# Patient Record
Sex: FEMALE | Race: BLACK OR AFRICAN AMERICAN | NOT HISPANIC OR LATINO | Employment: FULL TIME | URBAN - METROPOLITAN AREA
[De-identification: names, ages, dates, MRNs, and addresses within clinical notes are randomized per-mention and may not be internally consistent; named-entity substitution may affect disease eponyms.]

---

## 2023-09-13 ENCOUNTER — TELEPHONE (OUTPATIENT)
Dept: PRIMARY CARE | Facility: CLINIC | Age: 43
End: 2023-09-13
Payer: COMMERCIAL

## 2023-09-13 NOTE — TELEPHONE ENCOUNTER
Patient needs a letter to state that she is able to travel internationally with her medications for her upcoming trip. Patient states the HCTV and Amlodipine is what she would like addressed. Patient also would like to bring Zyrtec for allergies. Patient would like that included in letter and OK to take with her on trip. Patient is requesting that she can  the letter at the office. Please advise when letter is completed.

## 2023-09-21 LAB
ALANINE AMINOTRANSFERASE (SGPT) (U/L) IN SER/PLAS: 11 U/L (ref 7–45)
ALBUMIN (G/DL) IN SER/PLAS: 3.9 G/DL (ref 3.4–5)
ALKALINE PHOSPHATASE (U/L) IN SER/PLAS: 45 U/L (ref 33–110)
ASPARTATE AMINOTRANSFERASE (SGOT) (U/L) IN SER/PLAS: 18 U/L (ref 9–39)
BASOPHILS (10*3/UL) IN BLOOD BY AUTOMATED COUNT: 0.02 X10E9/L (ref 0–0.1)
BASOPHILS/100 LEUKOCYTES IN BLOOD BY AUTOMATED COUNT: 0.3 % (ref 0–2)
BILIRUBIN DIRECT (MG/DL) IN SER/PLAS: 0.1 MG/DL (ref 0–0.3)
BILIRUBIN TOTAL (MG/DL) IN SER/PLAS: 0.6 MG/DL (ref 0–1.2)
C REACTIVE PROTEIN (MG/L) IN SER/PLAS: 0.12 MG/DL
CALCIDIOL (25 OH VITAMIN D3) (NG/ML) IN SER/PLAS: 95 NG/ML
COBALAMIN (VITAMIN B12) (PG/ML) IN SER/PLAS: 757 PG/ML (ref 211–911)
EOSINOPHILS (10*3/UL) IN BLOOD BY AUTOMATED COUNT: 0.14 X10E9/L (ref 0–0.7)
EOSINOPHILS/100 LEUKOCYTES IN BLOOD BY AUTOMATED COUNT: 2.3 % (ref 0–6)
ERYTHROCYTE DISTRIBUTION WIDTH (RATIO) BY AUTOMATED COUNT: 14 % (ref 11.5–14.5)
ERYTHROCYTE MEAN CORPUSCULAR HEMOGLOBIN CONCENTRATION (G/DL) BY AUTOMATED: 31 G/DL (ref 32–36)
ERYTHROCYTE MEAN CORPUSCULAR VOLUME (FL) BY AUTOMATED COUNT: 86 FL (ref 80–100)
ERYTHROCYTES (10*6/UL) IN BLOOD BY AUTOMATED COUNT: 4.23 X10E12/L (ref 4–5.2)
HEMATOCRIT (%) IN BLOOD BY AUTOMATED COUNT: 36.4 % (ref 36–46)
HEMOGLOBIN (G/DL) IN BLOOD: 11.3 G/DL (ref 12–16)
IMMATURE GRANULOCYTES/100 LEUKOCYTES IN BLOOD BY AUTOMATED COUNT: 0.2 % (ref 0–0.9)
LEUKOCYTES (10*3/UL) IN BLOOD BY AUTOMATED COUNT: 6.1 X10E9/L (ref 4.4–11.3)
LYMPHOCYTES (10*3/UL) IN BLOOD BY AUTOMATED COUNT: 1.84 X10E9/L (ref 1.2–4.8)
LYMPHOCYTES/100 LEUKOCYTES IN BLOOD BY AUTOMATED COUNT: 30.1 % (ref 13–44)
MONOCYTES (10*3/UL) IN BLOOD BY AUTOMATED COUNT: 0.44 X10E9/L (ref 0.1–1)
MONOCYTES/100 LEUKOCYTES IN BLOOD BY AUTOMATED COUNT: 7.2 % (ref 2–10)
NEUTROPHILS (10*3/UL) IN BLOOD BY AUTOMATED COUNT: 3.67 X10E9/L (ref 1.2–7.7)
NEUTROPHILS/100 LEUKOCYTES IN BLOOD BY AUTOMATED COUNT: 59.9 % (ref 40–80)
NRBC (PER 100 WBCS) BY AUTOMATED COUNT: 0 /100 WBC (ref 0–0)
PLATELETS (10*3/UL) IN BLOOD AUTOMATED COUNT: 250 X10E9/L (ref 150–450)
PROTEIN TOTAL: 7.4 G/DL (ref 6.4–8.2)

## 2023-10-02 ENCOUNTER — PHARMACY VISIT (OUTPATIENT)
Dept: PHARMACY | Facility: CLINIC | Age: 43
End: 2023-10-02
Payer: COMMERCIAL

## 2023-10-02 PROCEDURE — RXMED WILLOW AMBULATORY MEDICATION CHARGE

## 2023-10-05 ENCOUNTER — HOSPITAL ENCOUNTER (EMERGENCY)
Facility: HOSPITAL | Age: 43
Discharge: HOME | End: 2023-10-05
Attending: EMERGENCY MEDICINE
Payer: COMMERCIAL

## 2023-10-05 DIAGNOSIS — U07.1 COVID-19: Primary | ICD-10-CM

## 2023-10-05 LAB
ALBUMIN SERPL BCP-MCNC: 3.8 G/DL (ref 3.4–5)
ALP SERPL-CCNC: 40 U/L (ref 33–110)
ALT SERPL W P-5'-P-CCNC: 12 U/L (ref 7–45)
ANION GAP SERPL CALC-SCNC: 11 MMOL/L (ref 10–20)
AST SERPL W P-5'-P-CCNC: 23 U/L (ref 9–39)
BASOPHILS # BLD AUTO: 0.02 X10*3/UL (ref 0–0.1)
BASOPHILS NFR BLD AUTO: 0.5 %
BILIRUB SERPL-MCNC: 0.4 MG/DL (ref 0–1.2)
BUN SERPL-MCNC: 14 MG/DL (ref 6–23)
CALCIUM SERPL-MCNC: 8.9 MG/DL (ref 8.6–10.3)
CHLORIDE SERPL-SCNC: 100 MMOL/L (ref 98–107)
CO2 SERPL-SCNC: 29 MMOL/L (ref 21–32)
CREAT SERPL-MCNC: 0.83 MG/DL (ref 0.5–1.05)
EOSINOPHIL # BLD AUTO: 0.03 X10*3/UL (ref 0–0.7)
EOSINOPHIL NFR BLD AUTO: 0.8 %
ERYTHROCYTE [DISTWIDTH] IN BLOOD BY AUTOMATED COUNT: 13.8 % (ref 11.5–14.5)
GFR SERPL CREATININE-BSD FRML MDRD: 90 ML/MIN/1.73M*2
GLUCOSE SERPL-MCNC: 102 MG/DL (ref 74–99)
HCT VFR BLD AUTO: 36.3 % (ref 36–46)
HGB BLD-MCNC: 11.6 G/DL (ref 12–16)
IMM GRANULOCYTES # BLD AUTO: 0.01 X10*3/UL (ref 0–0.7)
IMM GRANULOCYTES NFR BLD AUTO: 0.3 % (ref 0–0.9)
LYMPHOCYTES # BLD AUTO: 0.99 X10*3/UL (ref 1.2–4.8)
LYMPHOCYTES NFR BLD AUTO: 26.2 %
MCH RBC QN AUTO: 26.9 PG (ref 26–34)
MCHC RBC AUTO-ENTMCNC: 32 G/DL (ref 32–36)
MCV RBC AUTO: 84 FL (ref 80–100)
MONOCYTES # BLD AUTO: 0.61 X10*3/UL (ref 0.1–1)
MONOCYTES NFR BLD AUTO: 16.1 %
NEUTROPHILS # BLD AUTO: 2.12 X10*3/UL (ref 1.2–7.7)
NEUTROPHILS NFR BLD AUTO: 56.1 %
NRBC BLD-RTO: 0 /100 WBCS (ref 0–0)
PLATELET # BLD AUTO: 192 X10*3/UL (ref 150–450)
PMV BLD AUTO: 10.1 FL (ref 7.5–11.5)
POTASSIUM SERPL-SCNC: 3.6 MMOL/L (ref 3.5–5.3)
PROT SERPL-MCNC: 7.3 G/DL (ref 6.4–8.2)
RBC # BLD AUTO: 4.32 X10*6/UL (ref 4–5.2)
SARS-COV-2 RNA RESP QL NAA+PROBE: DETECTED
SODIUM SERPL-SCNC: 136 MMOL/L (ref 136–145)
WBC # BLD AUTO: 3.8 X10*3/UL (ref 4.4–11.3)

## 2023-10-05 PROCEDURE — 99284 EMERGENCY DEPT VISIT MOD MDM: CPT | Performed by: EMERGENCY MEDICINE

## 2023-10-05 PROCEDURE — 80053 COMPREHEN METABOLIC PANEL: CPT | Performed by: EMERGENCY MEDICINE

## 2023-10-05 PROCEDURE — 2500000004 HC RX 250 GENERAL PHARMACY W/ HCPCS (ALT 636 FOR OP/ED): Performed by: EMERGENCY MEDICINE

## 2023-10-05 PROCEDURE — 85025 COMPLETE CBC W/AUTO DIFF WBC: CPT | Performed by: EMERGENCY MEDICINE

## 2023-10-05 PROCEDURE — 87635 SARS-COV-2 COVID-19 AMP PRB: CPT | Performed by: EMERGENCY MEDICINE

## 2023-10-05 PROCEDURE — 36415 COLL VENOUS BLD VENIPUNCTURE: CPT | Performed by: EMERGENCY MEDICINE

## 2023-10-05 PROCEDURE — 96360 HYDRATION IV INFUSION INIT: CPT

## 2023-10-05 PROCEDURE — 99283 EMERGENCY DEPT VISIT LOW MDM: CPT | Mod: 25

## 2023-10-05 RX ORDER — ONDANSETRON HYDROCHLORIDE 2 MG/ML
4 INJECTION, SOLUTION INTRAVENOUS ONCE
Status: DISCONTINUED | OUTPATIENT
Start: 2023-10-05 | End: 2023-10-05 | Stop reason: HOSPADM

## 2023-10-05 RX ADMIN — SODIUM CHLORIDE 1000 ML: 9 INJECTION, SOLUTION INTRAVENOUS at 11:00

## 2023-10-05 NOTE — ED PROVIDER NOTES
HPI   No chief complaint on file.      42-year-old female presents with lightheadedness and generalized weakness.  She has had symptoms for several days but was much worse today while at work.  She did have the COVID booster this past Monday and her symptoms started that day.  She has fatigue and body aches, no significant cough or chest pain.  She reports fever and chills.                          No data recorded                Patient History   Past Medical History:   Diagnosis Date    Crohn's disease of small intestine without complications (CMS/[facility]) 03/29/2019    Crohn's disease of ileum    Lower abdominal pain, unspecified 03/24/2022    Abdominal pain, lower     Past Surgical History:   Procedure Laterality Date    OTHER SURGICAL HISTORY  01/24/2019    Ileocecal resection    OTHER SURGICAL HISTORY  01/08/2020    Small bowel resection    OTHER SURGICAL HISTORY  12/27/2019    Colectomy    OTHER SURGICAL HISTORY  11/04/2019    [place] procedure    OTHER SURGICAL HISTORY  11/04/2019    Right hemicolectomy    OTHER SURGICAL HISTORY  11/04/2019    Ileostomy closure     No family history on file.  Social History     Tobacco Use    Smoking status: Not on file    Smokeless tobacco: Not on file   Substance Use Topics    Alcohol use: Not on file    Drug use: Not on file       Physical Exam   ED Triage Vitals   Temp Pulse Resp BP   -- -- -- --      SpO2 Temp src Heart Rate Source Patient Position   -- -- -- --      BP Location FiO2 (%)     -- --       Physical Exam  Vitals and nursing note reviewed.   Constitutional:       General: She is not in acute distress.     Appearance: She is well-developed. She is not ill-appearing.   HENT:      Head: Normocephalic and atraumatic.   Eyes:      Conjunctiva/sclera: Conjunctivae normal.   Cardiovascular:      Heart sounds: No murmur heard.  Abdominal:      Palpations: Abdomen is soft.   Musculoskeletal:         General: Swelling present.   Skin:     General: Skin is dry.    Neurological:      Mental Status: She is alert.   Psychiatric:         Mood and Affect: Mood normal.         ED Course & MDM   Diagnoses as of 10/05/23 1222   COVID-19       Medical Decision Making  She is slightly leukopenic with slightly low lymphocytes.  CMP unremarkable.  She is COVID-positive.  She was given IV fluids and is feeling better.  1 L of normal saline and 4 mg of IV Zofran.  She is able to ambulate.  She is comfortable going home.  Return precautions explained at length.        Procedure  Procedures     Taj Jones MD  10/05/23 1228

## 2023-10-05 NOTE — Clinical Note
Samina Javier was seen and treated in our emergency department on 10/5/2023.  She may return to work on 10/07/2023.       If you have any questions or concerns, please don't hesitate to call.      Taj Jones MD

## 2023-10-11 ENCOUNTER — PHARMACY VISIT (OUTPATIENT)
Dept: PHARMACY | Facility: CLINIC | Age: 43
End: 2023-10-11
Payer: COMMERCIAL

## 2023-10-11 DIAGNOSIS — I10 UNCONTROLLED HYPERTENSION: Primary | ICD-10-CM

## 2023-10-11 DIAGNOSIS — I10 HYPERTENSION, ESSENTIAL: ICD-10-CM

## 2023-10-11 PROBLEM — K50.90 CROHN DISEASE (MULTI): Status: ACTIVE | Noted: 2023-10-11

## 2023-10-11 PROBLEM — L91.0 HYPERTROPHIC SCAR: Status: ACTIVE | Noted: 2023-10-11

## 2023-10-11 PROBLEM — H61.22 IMPACTED CERUMEN OF LEFT EAR: Status: ACTIVE | Noted: 2023-10-11

## 2023-10-11 PROBLEM — R92.8 ABNORMAL MAMMOGRAM: Status: ACTIVE | Noted: 2023-10-11

## 2023-10-11 PROBLEM — R10.30 ABDOMINAL PAIN, LOWER: Status: ACTIVE | Noted: 2023-10-11

## 2023-10-11 PROBLEM — D50.9 IRON DEFICIENCY ANEMIA: Status: ACTIVE | Noted: 2023-10-11

## 2023-10-11 PROBLEM — H93.8X2 SENSATION OF FULLNESS IN LEFT EAR: Status: ACTIVE | Noted: 2023-10-11

## 2023-10-11 PROBLEM — Q43.3 INTESTINAL MALROTATION (MULTI): Status: ACTIVE | Noted: 2023-10-11

## 2023-10-11 PROBLEM — E55.9 VITAMIN D DEFICIENCY: Status: ACTIVE | Noted: 2023-10-11

## 2023-10-11 PROCEDURE — RXMED WILLOW AMBULATORY MEDICATION CHARGE

## 2023-10-11 RX ORDER — ATOVAQUONE AND PROGUANIL HYDROCHLORIDE 250; 100 MG/1; MG/1
1 TABLET, FILM COATED ORAL
COMMUNITY
Start: 2023-07-20 | End: 2023-11-16 | Stop reason: ALTCHOICE

## 2023-10-11 RX ORDER — HYOSCYAMINE SULFATE 0.12 MG/1
0.12 TABLET, ORALLY DISINTEGRATING ORAL 4 TIMES DAILY
COMMUNITY
Start: 2021-04-15 | End: 2024-01-26 | Stop reason: ALTCHOICE

## 2023-10-11 RX ORDER — AMLODIPINE BESYLATE 10 MG/1
10 TABLET ORAL DAILY
Qty: 90 TABLET | Refills: 3 | Status: SHIPPED | OUTPATIENT
Start: 2023-10-11 | End: 2024-04-24 | Stop reason: ALTCHOICE

## 2023-10-11 RX ORDER — CIPROFLOXACIN 500 MG/1
500 TABLET ORAL 2 TIMES DAILY
COMMUNITY
Start: 2023-07-11 | End: 2023-11-16 | Stop reason: ALTCHOICE

## 2023-10-11 RX ORDER — HYDROCHLOROTHIAZIDE 12.5 MG/1
1 TABLET ORAL DAILY
COMMUNITY
Start: 2022-11-09 | End: 2023-11-13 | Stop reason: SDUPTHER

## 2023-10-11 RX ORDER — UBIDECARENONE 75 MG
1 CAPSULE ORAL DAILY
COMMUNITY

## 2023-10-11 RX ORDER — LOPERAMIDE HYDROCHLORIDE 2 MG/1
2 CAPSULE ORAL 3 TIMES DAILY PRN
COMMUNITY
Start: 2020-04-09

## 2023-10-24 DIAGNOSIS — K50.812 CROHN'S DISEASE OF BOTH SMALL AND LARGE INTESTINE WITH INTESTINAL OBSTRUCTION (MULTI): Primary | ICD-10-CM

## 2023-10-27 ENCOUNTER — HOSPITAL ENCOUNTER (OUTPATIENT)
Dept: CARDIOLOGY | Facility: HOSPITAL | Age: 43
Discharge: HOME | End: 2023-10-27
Payer: COMMERCIAL

## 2023-10-27 LAB
ATRIAL RATE: 79 BPM
P AXIS: -10 DEGREES
PR INTERVAL: 138 MS
Q ONSET: 253 MS
QRS COUNT: 13 BEATS
QRS DURATION: 105 MS
QT INTERVAL: 422 MS
QTC CALCULATION(BAZETT): 481 MS
QTC FREDERICIA: 460 MS
R AXIS: 76 DEGREES
T AXIS: 43 DEGREES
T OFFSET: 464 MS
VENTRICULAR RATE: 78 BPM

## 2023-10-27 PROCEDURE — 93005 ELECTROCARDIOGRAM TRACING: CPT

## 2023-10-27 RX ORDER — ADALIMUMAB 40MG/0.4ML
KIT SUBCUTANEOUS
Qty: 12 EACH | Refills: 3 | Status: SHIPPED | OUTPATIENT
Start: 2023-10-27 | End: 2024-10-25

## 2023-11-08 ENCOUNTER — PHARMACY VISIT (OUTPATIENT)
Dept: PHARMACY | Facility: CLINIC | Age: 43
End: 2023-11-08
Payer: COMMERCIAL

## 2023-11-08 PROCEDURE — RXMED WILLOW AMBULATORY MEDICATION CHARGE

## 2023-11-13 ENCOUNTER — PHARMACY VISIT (OUTPATIENT)
Dept: PHARMACY | Facility: CLINIC | Age: 43
End: 2023-11-13
Payer: COMMERCIAL

## 2023-11-13 DIAGNOSIS — I10 UNCONTROLLED HYPERTENSION: Primary | ICD-10-CM

## 2023-11-13 DIAGNOSIS — I10 PRIMARY HYPERTENSION: ICD-10-CM

## 2023-11-13 PROCEDURE — RXMED WILLOW AMBULATORY MEDICATION CHARGE

## 2023-11-13 RX ORDER — HYDROCHLOROTHIAZIDE 12.5 MG/1
12.5 TABLET ORAL DAILY
Qty: 90 TABLET | Refills: 3 | Status: SHIPPED | OUTPATIENT
Start: 2023-11-13 | End: 2024-11-12

## 2023-11-15 NOTE — PROGRESS NOTES
Subjective   Patient ID: Samina Javier is a 43 y.o. female who presents for her annual physical       She enjoyed her time in [place] in 9/2023  She caught COVID from one of the people that went with her     She was supposed to have the hysteroscopy and D&C in 5/2023 but there was issues with the schedules.     Her BP readings at home have been systolic 110- 115  The hydrochlorothiazide causes her to have urinary frequency    She is walking the dog for exercise 3- 4 times a week   She has trouble making an exercise regimen   She is eating well.     She complains of an area on her ear that has been present for a few years  It scabs over and keeps coming back. She is unsure why it will not heal completely     She complains of intermittent right shoulder pain  Sometimes the pain is really bad. Getting a massage will help the shoulder  She started going to the stretch lab and that has helped     She has intermittent paresthesia and burning pain in the left knee  She is unable to associate her Sx with any particular movement.  The pain happens sitting, standing and supine. It has become more bothersome to her   She denies injury to the left knee       HEALTH:  Pap more than 5 years ago, attempted 11/2022 but failed, She saw Dr Xie in 2/2023  Mammo 2016, 12/2021, 12/2022, ordered 11/2023  BD never   Colon 10/2020 Rutgeerts i1 recurrence with a single 2 mm erosion in izabella-TI. 1/2023 normal and Q 3- 5  EKG 2019, 11/2021, 10/2023  Urine  Hep A 7/11/2023   Hep B vaccine completed x 3   Flu 9/2021, 10/2022, 11/2023  TDAP she will check for date   Prevnar never  Pneumo never  Shingrix 3/2020 and 8/2020   Moderna CVD 12/28/2021 and 1/25/2021 booster 11/5/2021, 5/2022, 11/2/2022, 10/2/2023  Ophth Last seen summer 2023. No history of glaucoma or MD. She wears glasses        Review of Systems  All systems negative except those listed in the HPI      Past Medical, Surgical, and Family History reviewed and updated in  chart.  Reviewed all medications by prescribing practitioner or clinical pharmacist   (such as prescriptions, OTCs, herbal therapies and supplements) and documented in the medical record      Objective   Visit Vitals  /74 (BP Location: Left arm, Patient Position: Sitting, BP Cuff Size: Adult)   Pulse 55   Temp 36.5 °C (97.7 °F)   Resp 16    Body mass index is 22.11 kg/m².      Physical Exam  Vitals reviewed.   Constitutional:       Appearance: Normal appearance. She is normal weight.   HENT:      Head: Normocephalic.      Right Ear: Tympanic membrane, ear canal and external ear normal.      Left Ear: Tympanic membrane, ear canal and external ear normal.      Nose: Nose normal.      Mouth/Throat:      Pharynx: Oropharynx is clear.   Eyes:      Conjunctiva/sclera: Conjunctivae normal.   Cardiovascular:      Rate and Rhythm: Normal rate and regular rhythm.      Pulses: Normal pulses.      Heart sounds: Normal heart sounds.   Pulmonary:      Effort: Pulmonary effort is normal.      Breath sounds: Normal breath sounds.   Abdominal:      General: Bowel sounds are normal.      Palpations: Abdomen is soft.   Musculoskeletal:         General: Normal range of motion.      Cervical back: Normal range of motion and neck supple.      Comments: full ROM but she is really tight, right shoulder raised, nothing noted in the scapular area or rotator cuff    Skin:     General: Skin is warm.      Comments: Eczema auricle right ear and scabbed lesion on the neck   Neurological:      General: No focal deficit present.      Mental Status: She is alert and oriented to person, place, and time.   Psychiatric:         Mood and Affect: Mood normal.         Behavior: Behavior normal.         Thought Content: Thought content normal.         Judgment: Judgment normal.       Assessment/Plan        Annual physical completed  Reviewed her labs from 9/2023 and 10/2023     [facility]  -  Discussed healthy diet and regular  exercise.    -  Physical exam overall unremarkable. Immunizations reviewed and updated accordingly. Healthy lifestyle choices discussed (tobacco avoidance, appropriate alcohol use, avoidance of illicit substances).   -  Patient is wearing seatbelt.   -  Screening lab work ordered as indicated.    -  Age appropriate screening tests reviewed with patient.       She is single with no children. 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  CMP unremarkable.    She is COVID-positive.  She was given IV fluids and is feeling better.    1 L of normal saline and 4 mg of IV Zofran.    She is able to ambulate.  She is comfortable going home.    Return precautions explained at length.      She saw Dr Connors in ENT and had cerumen cleaned bilaterally 6/28/2023  She has no issues with her hearing to report      Her weight/ BMI is in normal range in office, recommend she maintain   Her weight is 117 pounds with BMI at 22.11 IO 11/2023  She walks her dog 3- 4 times a week      HTN, onset 11/2021: Her BP readings at home have been systolic 110- 115.   Her mother started BP medications in her 70's   Her dad has CAD w/ stent.   Her sister is on amlodipine as well and had pericarditis last year 2021.   She feels stress is exacerbating her BP   Continue Norvasc 10 mg daily and hydrochlorothiazide 12.5 mg daily   EKG 10/2023 was sinus rhythm   Renal artery US 11/2021 normal   ECHO 11/2021 was normal   Recommend she monitor her BP at home and call with elevated readings      I have spent 15 min face to face with this patient discussing their cardiac risk and behavioral therapies of nutrition choices and exercise. We are trying to eliminate habits that are contributing to their  cardiac risk.  We agreed on a plan of how they can reduce their current CV risk   The patient's  10 yr CV risk was estimated at   0.7 % 11/2023     Crohn disease: Stable   She has struggled with this since age 14.    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 Extensive lysis of adhesions interloop, abdominal wall and retroperitoneal, hepatic, drainage of intra- abdominal abscess, small bowel resection and stricturoplasty with repair of enterotomies on 12/9/2019 with Dr Reeves   MRI 2017 showed 7 cm of disease   Stopped Remicade in 10/2018  MRI Enterography 6/2022 showed post surgical changes c/w Juanpablo's procedure and ileocolonic resection with primary anastomosis. No evidence of thickening or enhancement to suggest an acute Crohn's flare. Mild central intrahepatic biliary dilation with slight irregularity of the dilated duct predominantly in the left lobe without definite stricture. Cholelithiasis with gallstone in the neck of the gallbladder with abutting the common hepatic duct. Subseptate uterus with a fibroid. Hemorrhagic right ovarian cyst   Colonoscopy 1/2023 normal and Q 3- 5    She has hyoscyamine to use prn abdominal pain and cramping   Continue Humira 40 mg SQ Q 14 days and loperamide 1- 2 tablets preprandial QID  She saw Dr Pierce in 9/2023 and follow in a year     Intermittent right shoulder pain: On exam: full ROM but she is really tight, right shoulder raised, nothing noted in the scapular area or rotator cuff 11/2023  Sometimes the pain is really bad. Getting a massage will help the shoulder  She started going to the stretch lab and that has helped   Recommend and orders placed for PT and discuss dry needling 11/2023    Intermittent paresthesia and burning pain in the lateral left knee: Explained I think  this is a nerve being impinged. Recommend using a light sleeve over the knee when she is going to stand for long periods   She is unable to associate her Sx with any particular movement.  The pain happens sitting, standing and supine. It has become more bothersome to her   She denies injury to the left knee.  Recommend and orders placed for Xray left knee for further evaluation 11/2023  Recommend and orders placed for PT 11/2023    Eczema auricle right ear and scabbed lesion on the neck: On exam: scabbing over eczema patch right ear 11/2023  She complains of an area on her ear that has been present for a few years  It scabs over and keeps coming back. She is unsure why it will not heal completely   Recommend and orders placed for dermatology for further evaluation 11/2023     Vitamin D def:  Continue scripted Vitamin D 50 K UT twice weekly   Continue Vitamin B 12 daily      Pap more than 5 years ago. She saw Dr Xie in 2/2023.  Pap attempted but failed due to tight pelvic structures > right 11/2022.    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 There is extensive abdominal scar tissue that limits exam. The endometrium is suboptimally visualized and appears to be prominent. The right ovary is subvisualized.  The left ovary is not visualized. The bladder is not well distended. Transvaginal could not be done due to patient's inability to tolerate.   Recommend hysteroscopy and D&C 2/2023. She was supposed to have the hysteroscopy and D&C in 5/2023 but there was issues with the schedules.    She will see Dr Xie again in 2/2024 and they will discuss hysteroscopy and D&C then     Mammo normal in 12/2022 and ordered 11/2023..   Breast exam normal except right breast more nodular than left 11/2022   Her sister sent her information from her genetic testing, she will check for BRACA   Sister, maternal grandmother and maternal aunt with breast cancer      BD never   Colonoscopy 1/2023  normal and Q 3- 5      Ophth:  Last seen summer 2023. No history of glaucoma or MD. She wears glasses      Hep A 7/11/2023   Hep B vaccine completed x 3   Flu 9/2021, 10/2022, 11/2023  TDAP she will check for date   Prevnar never  Pneumo never  Shingrix 3/2020 and 8/2020   Moderna CVD 12/28/2021 and 1/25/2021 booster 11/5/2021, 5/2022, 11/2/2022, 10/2/2023     Some elements in the chart were copied from Dr. Palumbo's last office visit with patient.   Notes have been updated where appropriate, and reflect my current medical decision making from today.      Return in 1 year for CPE or sooner if needed     Scribe Attestation  By signing my name below, I, Kathy Quispe , Scribe   attest that this documentation has been prepared under the direction and in the presence of Dorothea Palumbo MD.

## 2023-11-16 ENCOUNTER — OFFICE VISIT (OUTPATIENT)
Dept: PRIMARY CARE | Facility: CLINIC | Age: 43
End: 2023-11-16
Payer: COMMERCIAL

## 2023-11-16 VITALS
TEMPERATURE: 97.7 F | WEIGHT: 117 LBS | HEIGHT: 61 IN | RESPIRATION RATE: 16 BRPM | OXYGEN SATURATION: 100 % | DIASTOLIC BLOOD PRESSURE: 74 MMHG | HEART RATE: 55 BPM | BODY MASS INDEX: 22.09 KG/M2 | SYSTOLIC BLOOD PRESSURE: 128 MMHG

## 2023-11-16 DIAGNOSIS — K50.10 CROHN'S DISEASE OF LARGE INTESTINE WITHOUT COMPLICATION (MULTI): ICD-10-CM

## 2023-11-16 DIAGNOSIS — D50.9 IRON DEFICIENCY ANEMIA, UNSPECIFIED IRON DEFICIENCY ANEMIA TYPE: ICD-10-CM

## 2023-11-16 DIAGNOSIS — M25.562 CHRONIC PAIN OF LEFT KNEE: ICD-10-CM

## 2023-11-16 DIAGNOSIS — G89.29 CHRONIC PAIN OF LEFT KNEE: ICD-10-CM

## 2023-11-16 DIAGNOSIS — E55.9 VITAMIN D DEFICIENCY: ICD-10-CM

## 2023-11-16 DIAGNOSIS — Z12.31 VISIT FOR SCREENING MAMMOGRAM: ICD-10-CM

## 2023-11-16 DIAGNOSIS — Z23 NEED FOR INFLUENZA VACCINATION: ICD-10-CM

## 2023-11-16 DIAGNOSIS — H93.91 LESION OF RIGHT EAR: ICD-10-CM

## 2023-11-16 DIAGNOSIS — L98.9 SKIN LESION OF NECK: ICD-10-CM

## 2023-11-16 DIAGNOSIS — M54.2 NECK PAIN ON RIGHT SIDE: ICD-10-CM

## 2023-11-16 DIAGNOSIS — I10 ESSENTIAL HYPERTENSION: ICD-10-CM

## 2023-11-16 DIAGNOSIS — Z00.00 HEALTHCARE MAINTENANCE: Primary | ICD-10-CM

## 2023-11-16 PROBLEM — H93.8X2 SENSATION OF FULLNESS IN LEFT EAR: Status: RESOLVED | Noted: 2023-10-11 | Resolved: 2023-11-16

## 2023-11-16 PROCEDURE — 90686 IIV4 VACC NO PRSV 0.5 ML IM: CPT | Performed by: INTERNAL MEDICINE

## 2023-11-16 PROCEDURE — 3074F SYST BP LT 130 MM HG: CPT | Performed by: INTERNAL MEDICINE

## 2023-11-16 PROCEDURE — 99396 PREV VISIT EST AGE 40-64: CPT | Performed by: INTERNAL MEDICINE

## 2023-11-16 PROCEDURE — 1036F TOBACCO NON-USER: CPT | Performed by: INTERNAL MEDICINE

## 2023-11-16 PROCEDURE — 3078F DIAST BP <80 MM HG: CPT | Performed by: INTERNAL MEDICINE

## 2023-11-16 PROCEDURE — 3008F BODY MASS INDEX DOCD: CPT | Performed by: INTERNAL MEDICINE

## 2023-11-16 PROCEDURE — 90471 IMMUNIZATION ADMIN: CPT | Performed by: INTERNAL MEDICINE

## 2023-11-16 ASSESSMENT — PATIENT HEALTH QUESTIONNAIRE - PHQ9
2. FEELING DOWN, DEPRESSED OR HOPELESS: NOT AT ALL
1. LITTLE INTEREST OR PLEASURE IN DOING THINGS: NOT AT ALL
SUM OF ALL RESPONSES TO PHQ9 QUESTIONS 1 AND 2: 0

## 2023-11-16 ASSESSMENT — ENCOUNTER SYMPTOMS
LOSS OF SENSATION IN FEET: 0
OCCASIONAL FEELINGS OF UNSTEADINESS: 0
DEPRESSION: 0

## 2023-11-29 ENCOUNTER — PHARMACY VISIT (OUTPATIENT)
Dept: PHARMACY | Facility: CLINIC | Age: 43
End: 2023-11-29
Payer: COMMERCIAL

## 2023-11-29 PROCEDURE — RXMED WILLOW AMBULATORY MEDICATION CHARGE

## 2023-12-05 ENCOUNTER — HOSPITAL ENCOUNTER (OUTPATIENT)
Dept: RADIOLOGY | Facility: HOSPITAL | Age: 43
Discharge: HOME | End: 2023-12-05
Payer: COMMERCIAL

## 2023-12-05 DIAGNOSIS — G89.29 CHRONIC PAIN OF LEFT KNEE: ICD-10-CM

## 2023-12-05 DIAGNOSIS — M25.562 CHRONIC PAIN OF LEFT KNEE: ICD-10-CM

## 2023-12-05 PROCEDURE — 73562 X-RAY EXAM OF KNEE 3: CPT | Mod: LEFT SIDE | Performed by: RADIOLOGY

## 2023-12-05 PROCEDURE — 73562 X-RAY EXAM OF KNEE 3: CPT | Mod: LT

## 2023-12-13 ENCOUNTER — ANCILLARY PROCEDURE (OUTPATIENT)
Dept: RADIOLOGY | Facility: CLINIC | Age: 43
End: 2023-12-13
Payer: COMMERCIAL

## 2023-12-13 DIAGNOSIS — Z12.31 VISIT FOR SCREENING MAMMOGRAM: ICD-10-CM

## 2023-12-13 PROCEDURE — 77067 SCR MAMMO BI INCL CAD: CPT | Performed by: RADIOLOGY

## 2023-12-13 PROCEDURE — 77067 SCR MAMMO BI INCL CAD: CPT

## 2023-12-13 PROCEDURE — 77063 BREAST TOMOSYNTHESIS BI: CPT | Performed by: RADIOLOGY

## 2023-12-14 DIAGNOSIS — N64.89 BREAST ASYMMETRY: Primary | ICD-10-CM

## 2023-12-20 ENCOUNTER — TELEPHONE (OUTPATIENT)
Dept: INFECTIOUS DISEASES | Facility: HOSPITAL | Age: 43
End: 2023-12-20
Payer: COMMERCIAL

## 2023-12-20 NOTE — TELEPHONE ENCOUNTER
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   She need a vaccine for YF and any other medications needed for [place] traveling 3/5-3/17 2024.     [facility]. Please also advise she needs her 2nd hep A after 1/11/2024.

## 2023-12-21 DIAGNOSIS — Z23 NEED FOR PROPHYLACTIC VACCINATION AND INOCULATION AGAINST INFLUENZA: ICD-10-CM

## 2023-12-21 DIAGNOSIS — Z71.84 COUNSELING FOR TRAVEL: Primary | ICD-10-CM

## 2023-12-21 DIAGNOSIS — Z71.84 COUNSELING ABOUT TRAVEL: Primary | ICD-10-CM

## 2023-12-21 RX ORDER — CIPROFLOXACIN 500 MG/1
500 TABLET ORAL 2 TIMES DAILY
Qty: 6 TABLET | Refills: 0 | Status: SHIPPED | OUTPATIENT
Start: 2023-12-21 | End: 2024-01-12

## 2023-12-27 PROCEDURE — RXMED WILLOW AMBULATORY MEDICATION CHARGE

## 2023-12-28 ENCOUNTER — PHARMACY VISIT (OUTPATIENT)
Dept: PHARMACY | Facility: CLINIC | Age: 43
End: 2023-12-28
Payer: COMMERCIAL

## 2023-12-29 ENCOUNTER — ANCILLARY PROCEDURE (OUTPATIENT)
Dept: RADIOLOGY | Facility: CLINIC | Age: 43
End: 2023-12-29
Payer: COMMERCIAL

## 2023-12-29 DIAGNOSIS — N64.89 BREAST ASYMMETRY: ICD-10-CM

## 2023-12-29 PROCEDURE — 77061 BREAST TOMOSYNTHESIS UNI: CPT | Mod: LT

## 2023-12-29 PROCEDURE — 77065 DX MAMMO INCL CAD UNI: CPT | Mod: LEFT SIDE | Performed by: RADIOLOGY

## 2023-12-29 PROCEDURE — 76642 ULTRASOUND BREAST LIMITED: CPT | Mod: LEFT SIDE | Performed by: RADIOLOGY

## 2023-12-29 PROCEDURE — 77061 BREAST TOMOSYNTHESIS UNI: CPT | Mod: LEFT SIDE | Performed by: RADIOLOGY

## 2023-12-29 PROCEDURE — 76642 ULTRASOUND BREAST LIMITED: CPT | Mod: LT

## 2024-01-02 DIAGNOSIS — R92.8 ABNORMAL FINDING ON BREAST IMAGING: ICD-10-CM

## 2024-01-04 NOTE — PROGRESS NOTES
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  The patient has not felt any breast lump and has no breast pain.  No nipple discharge or retraction.  No prior breast biopsy.  She has a family history of breast cancer in her sister who was diagnosed at age 45, paternal grandmother diagnosed at age 50 paternal aunt diagnosed at age 40.  She is uncertain if any had genetic testing.  Patient's age of menarche was 11.  G0, P0.  Last menstrual period December 20, 2023.         Past medical history:  Crohn's disease status post multiple operations: Right hemicolectomy at the age of 18.  Repair of vesicoenteric fistula.  Small bowel resection 4 years ago.  She is now maintained on Humira since 2020.  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  Hypertension    Allergies: Penicillin caused an unknown reaction as an infant    Past Medical History  She has a past medical history of Crohn's disease of small intestine without complications (CMS/HCC) (03/29/2019) and Lower abdominal pain, unspecified (03/24/2022).    Surgical History  She has a past surgical history that includes Other surgical history (01/24/2019); Other surgical history (01/08/2020); Other surgical history (12/27/2019); Other surgical history (11/04/2019); Other surgical history (11/04/2019); and Other surgical history (11/04/2019).     Allergies  Penicillins    Social History  She reports that she has never smoked. She has never used smokeless tobacco. She reports that she does not currently use alcohol. She reports that she does not use drugs.    Family History  No family history on file.    Review of Systems  Review of Systems:  Constitutional:  no fever, no chills, no significant weight change  Neurological: No history of CVA or seizure disorder  Eyes: No pain, no recent visual change  ENT:  No recent hearing loss  Neck: No pain  Cardiovascular: No chest pain, no  "history of cardiac disease such as myocardial infarction or arrhythmia or congestive heart failure  Pulmonary: No shortness of breath, no history of pulmonary disease such as pneumonia or COPD  Breast: As above  Gastrointestinal:   As above.  No abdominal pain, no nausea or vomiting, no constipation or diarrhea or blood in the stool.  No history of ulcers.  No liver, gallbladder or pancreas disease.    Genitourinary: No hematuria or dysuria, no kidney disease  Musculoskeletal:  no arthralgia, no muscle or bone pain  Integumentary:  no rash  Psychiatric:  No anxiety or depression  Endocrine:  no history of diabetes  Hematologic/Lymphatic: No easy bruising or bleeding          Last Recorded Vitals  Blood pressure 130/86, pulse 69, temperature 36.6 °C (97.8 °F), resp. rate 18, height 1.549 m (5' 1\"), weight 49.9 kg (110 lb), last menstrual period 11/29/2023, SpO2 100 %.    Physical Exam  Constitutional: Well-developed, well-nourished, alert and oriented, no acute distress  Skin: Warm and dry, no lesions, no rashes, no jaundice  HEENT: Normocephalic, atraumatic, EOMI, no scleral icterus, mucous membranes moist, no lesions seen  Neck: Soft, nontender, no mass or adenopathy, no JVD  Cardiac: Regular rate and rhythm, no murmur  Chest: Patent airway, clear to auscultation, normal breath sounds with good chest expansion, no wheezes or rales or rhonchi noted, thorax symmetric  Breast:     right breast: No skin changes, nontender, no mass, mild fibrocystic change    left breast: No skin changes, nontender, no mass, mild fibrocystic change  Abdomen: Nondistended, multiple scars, soft, nontender, no mass  Rectal: Not performed  Extremities: No injury, no lower extremity edema or calf tenderness  Lymphatic: No cervical or axillary adenopathy  Musculoskeletal: Range of motion intact, no joint swelling, normal strength  Neurological: Alert and oriented x3, intact sensory and motor function, no obvious focal neurologic " abnormalities  Psychological: Appropriate mood and behavior  Examination chaperoned by Joyce    Relevant Results  I reviewed the bilateral mammogram report and images from December 13, 2023:  IMPRESSION:  Left breast asymmetry    BI-RADS Category:  0 Incomplete; Need Additional Imaging Evaluation  and/or Prior Mammograms for Comparison. Recommendation:  Recommendations as Above. Recommended Date:  Immediate.  Laterality:  Left.     I reviewed the diagnostic left breast mammogram and ultrasound report and images from December 29, 2023:  IMPRESSION:  There is an asymmetry medially in the left breast on the CC view.  This is persistent on the spot compression view in the CC projection  but not seen on the true lateral view. There is no sonographic  correlate. Given its definition on the standard CC view and spot  compression view in the CC projection as well as strong positive  family history, tissue diagnosis is recommended and this can be  biopsied under stereotactic guidance. This was discussed in depth  with the patient.    BI-RADS Category:  4 Suspicious.  Recommendation:  Stereotactic - Guided Breast Biopsy.  Recommended Date:  Immediate.  Laterality:  Left.    Assessment/Plan   Diagnoses and all orders for this visit:  Abnormal mammogram      Radiologist recommends stereotactic biopsy of left breast for evaluation of asymmetry located in the medial portion of breast.  The biopsy is scheduled for 1 week from today.  Follow-up after biopsy completed.           Ken Casillas MD

## 2024-01-05 ENCOUNTER — OFFICE VISIT (OUTPATIENT)
Dept: SURGERY | Facility: CLINIC | Age: 44
End: 2024-01-05
Payer: COMMERCIAL

## 2024-01-05 VITALS
RESPIRATION RATE: 18 BRPM | HEART RATE: 69 BPM | TEMPERATURE: 97.8 F | DIASTOLIC BLOOD PRESSURE: 86 MMHG | OXYGEN SATURATION: 100 % | SYSTOLIC BLOOD PRESSURE: 130 MMHG | BODY MASS INDEX: 20.77 KG/M2 | HEIGHT: 61 IN | WEIGHT: 110 LBS

## 2024-01-05 DIAGNOSIS — R92.8 ABNORMAL MAMMOGRAM: Primary | ICD-10-CM

## 2024-01-05 PROCEDURE — 99213 OFFICE O/P EST LOW 20 MIN: CPT | Performed by: SURGERY

## 2024-01-05 PROCEDURE — 3008F BODY MASS INDEX DOCD: CPT | Performed by: SURGERY

## 2024-01-05 PROCEDURE — 1036F TOBACCO NON-USER: CPT | Performed by: SURGERY

## 2024-01-05 PROCEDURE — 99203 OFFICE O/P NEW LOW 30 MIN: CPT | Performed by: SURGERY

## 2024-01-05 SDOH — ECONOMIC STABILITY: FOOD INSECURITY: WITHIN THE PAST 12 MONTHS, YOU WORRIED THAT YOUR FOOD WOULD RUN OUT BEFORE YOU GOT MONEY TO BUY MORE.: NEVER TRUE

## 2024-01-05 SDOH — ECONOMIC STABILITY: FOOD INSECURITY: WITHIN THE PAST 12 MONTHS, THE FOOD YOU BOUGHT JUST DIDN'T LAST AND YOU DIDN'T HAVE MONEY TO GET MORE.: NEVER TRUE

## 2024-01-05 ASSESSMENT — LIFESTYLE VARIABLES
SKIP TO QUESTIONS 9-10: 1
HOW OFTEN DO YOU HAVE A DRINK CONTAINING ALCOHOL: MONTHLY OR LESS
HOW MANY STANDARD DRINKS CONTAINING ALCOHOL DO YOU HAVE ON A TYPICAL DAY: 1 OR 2
AUDIT-C TOTAL SCORE: 1
HOW OFTEN DO YOU HAVE SIX OR MORE DRINKS ON ONE OCCASION: NEVER

## 2024-01-05 ASSESSMENT — COLUMBIA-SUICIDE SEVERITY RATING SCALE - C-SSRS
2. HAVE YOU ACTUALLY HAD ANY THOUGHTS OF KILLING YOURSELF?: NO
6. HAVE YOU EVER DONE ANYTHING, STARTED TO DO ANYTHING, OR PREPARED TO DO ANYTHING TO END YOUR LIFE?: NO
1. IN THE PAST MONTH, HAVE YOU WISHED YOU WERE DEAD OR WISHED YOU COULD GO TO SLEEP AND NOT WAKE UP?: NO

## 2024-01-05 ASSESSMENT — PATIENT HEALTH QUESTIONNAIRE - PHQ9
2. FEELING DOWN, DEPRESSED OR HOPELESS: NOT AT ALL
SUM OF ALL RESPONSES TO PHQ9 QUESTIONS 1 & 2: 0
1. LITTLE INTEREST OR PLEASURE IN DOING THINGS: NOT AT ALL

## 2024-01-05 ASSESSMENT — PAIN SCALES - GENERAL: PAINLEVEL: 0-NO PAIN

## 2024-01-05 NOTE — LETTER
January 5, 2024     Dorothea Palumbo MD  [address]  [facility] [place], [address]  [facility] [address]    Patient: Samina Javier   YOB: 1980   Date of Visit: 1/5/2024       Dear Dr. Dorothea Palumbo MD:    Thank you for referring Samina Javier to me for evaluation. Below are my notes for this consultation.  If you have questions, please do not hesitate to call me. I look forward to following your patient along with you.       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  The patient has not felt any breast lump and has no breast pain.  No nipple discharge or retraction.  No prior breast biopsy.  She has a family history of breast cancer in her sister who was diagnosed at age 45, paternal grandmother diagnosed at age 50 paternal aunt diagnosed at age 40.  She is uncertain if any had genetic testing.  Patient's age of menarche was 11.  G0, P0.  Last menstrual period December 20, 2023.         Past medical history:  Crohn's disease status post multiple operations: Right hemicolectomy at the age of 18.  Repair of vesicoenteric fistula.  Small bowel resection 4 years ago.  She is now maintained on Humira since 2020.  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  Hypertension    Allergies: Penicillin caused an unknown reaction as an infant    Past Medical History  She has a past medical history of Crohn's disease of small intestine without complications (CMS/HCC) (03/29/2019) and Lower abdominal pain, unspecified (03/24/2022).    Surgical History  She has a past surgical history that includes Other surgical history (01/24/2019); Other surgical history (01/08/2020); Other surgical history (12/27/2019); Other surgical history (11/04/2019); Other  "surgical history (11/04/2019); and Other surgical history (11/04/2019).     Allergies  Penicillins    Social History  She reports that she has never smoked. She has never used smokeless tobacco. She reports that she does not currently use alcohol. She reports that she does not use drugs.    Family History  No family history on file.    Review of Systems  Review of Systems:  Constitutional:  no fever, no chills, no significant weight change  Neurological: No history of CVA or seizure disorder  Eyes: No pain, no recent visual change  ENT:  No recent hearing loss  Neck: No pain  Cardiovascular: No chest pain, no history of cardiac disease such as myocardial infarction or arrhythmia or congestive heart failure  Pulmonary: No shortness of breath, no history of pulmonary disease such as pneumonia or COPD  Breast: As above  Gastrointestinal:   As above.  No abdominal pain, no nausea or vomiting, no constipation or diarrhea or blood in the stool.  No history of ulcers.  No liver, gallbladder or pancreas disease.    Genitourinary: No hematuria or dysuria, no kidney disease  Musculoskeletal:  no arthralgia, no muscle or bone pain  Integumentary:  no rash  Psychiatric:  No anxiety or depression  Endocrine:  no history of diabetes  Hematologic/Lymphatic: No easy bruising or bleeding          Last Recorded Vitals  Blood pressure 130/86, pulse 69, temperature 36.6 °C (97.8 °F), resp. rate 18, height 1.549 m (5' 1\"), weight 49.9 kg (110 lb), last menstrual period 11/29/2023, SpO2 100 %.    Physical Exam  Constitutional: Well-developed, well-nourished, alert and oriented, no acute distress  Skin: Warm and dry, no lesions, no rashes, no jaundice  HEENT: Normocephalic, atraumatic, EOMI, no scleral icterus, mucous membranes moist, no lesions seen  Neck: Soft, nontender, no mass or adenopathy, no JVD  Cardiac: Regular rate and rhythm, no murmur  Chest: Patent airway, clear to auscultation, normal breath sounds with good chest " expansion, no wheezes or rales or rhonchi noted, thorax symmetric  Breast:     right breast: No skin changes, nontender, no mass, mild fibrocystic change    left breast: No skin changes, nontender, no mass, mild fibrocystic change  Abdomen: Nondistended, multiple scars, soft, nontender, no mass  Rectal: Not performed  Extremities: No injury, no lower extremity edema or calf tenderness  Lymphatic: No cervical or axillary adenopathy  Musculoskeletal: Range of motion intact, no joint swelling, normal strength  Neurological: Alert and oriented x3, intact sensory and motor function, no obvious focal neurologic abnormalities  Psychological: Appropriate mood and behavior  Examination chaperoned by Joyce    Relevant Results  I reviewed the bilateral mammogram report and images from December 13, 2023:  IMPRESSION:  Left breast asymmetry    BI-RADS Category:  0 Incomplete; Need Additional Imaging Evaluation  and/or Prior Mammograms for Comparison. Recommendation:  Recommendations as Above. Recommended Date:  Immediate.  Laterality:  Left.     I reviewed the diagnostic left breast mammogram and ultrasound report and images from December 29, 2023:  IMPRESSION:  There is an asymmetry medially in the left breast on the CC view.  This is persistent on the spot compression view in the CC projection  but not seen on the true lateral view. There is no sonographic  correlate. Given its definition on the standard CC view and spot  compression view in the CC projection as well as strong positive  family history, tissue diagnosis is recommended and this can be  biopsied under stereotactic guidance. This was discussed in depth  with the patient.    BI-RADS Category:  4 Suspicious.  Recommendation:  Stereotactic - Guided Breast Biopsy.  Recommended Date:  Immediate.  Laterality:  Left.    Assessment/Plan  Diagnoses and all orders for this visit:  Abnormal mammogram      Radiologist recommends stereotactic biopsy of left breast for  evaluation of asymmetry located in the medial portion of breast.  The biopsy is scheduled for 1 week from today.  Follow-up after biopsy completed.           Ken Casillas MD

## 2024-01-08 PROCEDURE — RXMED WILLOW AMBULATORY MEDICATION CHARGE

## 2024-01-09 ENCOUNTER — PHARMACY VISIT (OUTPATIENT)
Dept: PHARMACY | Facility: CLINIC | Age: 44
End: 2024-01-09
Payer: COMMERCIAL

## 2024-01-12 ENCOUNTER — ANCILLARY PROCEDURE (OUTPATIENT)
Dept: RADIOLOGY | Facility: CLINIC | Age: 44
End: 2024-01-12
Payer: COMMERCIAL

## 2024-01-12 VITALS
HEART RATE: 71 BPM | RESPIRATION RATE: 12 BRPM | OXYGEN SATURATION: 100 % | DIASTOLIC BLOOD PRESSURE: 71 MMHG | SYSTOLIC BLOOD PRESSURE: 140 MMHG

## 2024-01-12 DIAGNOSIS — R92.8 ABNORMAL FINDING ON BREAST IMAGING: ICD-10-CM

## 2024-01-12 PROCEDURE — 77065 DX MAMMO INCL CAD UNI: CPT

## 2024-01-12 PROCEDURE — 19081 BX BREAST 1ST LESION STRTCTC: CPT | Mod: LT

## 2024-01-12 PROCEDURE — 88305 TISSUE EXAM BY PATHOLOGIST: CPT | Mod: TC,SUR,PARLAB | Performed by: SURGERY

## 2024-01-12 PROCEDURE — 19081 BX BREAST 1ST LESION STRTCTC: CPT | Performed by: RADIOLOGY

## 2024-01-12 PROCEDURE — 77065 DX MAMMO INCL CAD UNI: CPT | Performed by: RADIOLOGY

## 2024-01-12 PROCEDURE — 2720000007 HC OR 272 NO HCPCS

## 2024-01-12 PROCEDURE — 88305 TISSUE EXAM BY PATHOLOGIST: CPT | Performed by: PATHOLOGY

## 2024-01-12 ASSESSMENT — PAIN SCALES - GENERAL
PAINLEVEL_OUTOF10: 0 - NO PAIN
PAINLEVEL_OUTOF10: 0 - NO PAIN

## 2024-01-12 ASSESSMENT — PAIN - FUNCTIONAL ASSESSMENT: PAIN_FUNCTIONAL_ASSESSMENT: 0-10

## 2024-01-12 NOTE — DISCHARGE INSTRUCTIONS
REMOVE YOUR BANDAID OVER THE SITE TOMORROW BEFORE YOU SHOWER. UNDER THE BANDAID ARE STERI-STRIPS, KEEP THOSE ON UNTIL THEY FALL OFF ON THEIR OWN. IF THEY STAY ON FOR 5 DAYS, REMOVE THEM. NO SOAKING IN A BATH, POOL, OR HOT TUB FOR 48 HOURS. NO HEAVY LIFTING FOR 1-2 DAYS. USE ICE 20 MINUTES ON AND 20 MINUTES OFF TODAY. LOOK FOR SIGNS AND SYMPTOMS OF INFECTION- REDNESS, SWELLING, FEVER, AND PAIN. CALL IF YOU HAVE ANY OF THOSE. IT IS NORMAL TO BRUISE. IT CAN LAST UP TO A MONTH. TAKE OVER THE COUNTER PAIN MEDICATIONS FOR PAIN. YOUR RESULTS WILL POST TO YOUR MY CHART IN 3-10 DAYS. CALL WITH ANY QUESTIONS, [phone] OR JULY BREAST NURSE NAVIGATOR [phone].

## 2024-01-12 NOTE — POST-PROCEDURE NOTE
Interventional Radiology Brief Postprocedure Note    Attending: Agnes Alatorre MD      Assistant:     Diagnosis: stereotactic biopsy left    Description of procedure: steretotactic biopsy left breast     Anesthesia:  Local    Complications: None    Estimated Blood Loss: none    Medications (Filter: Administrations occurring from 1547 to 1547 on 01/12/24)      None          ID Type Source Tests Collected by Time   1 : LEFT BREAST 0700 7 CM FN Tissue BREAST CORE BIOPSY LEFT SURGICAL PATHOLOGY EXAM Agnes Alatorre MD 1/12/2024 0932         See detailed result report with images in PACS.    The patient tolerated the procedure well without incident or complication and is in stable condition.

## 2024-01-16 LAB
LABORATORY COMMENT REPORT: NORMAL
PATH REPORT.FINAL DX SPEC: NORMAL
PATH REPORT.GROSS SPEC: NORMAL
PATH REPORT.TOTAL CANCER: NORMAL

## 2024-01-17 ENCOUNTER — APPOINTMENT (OUTPATIENT)
Dept: RADIOLOGY | Facility: HOSPITAL | Age: 44
End: 2024-01-17
Payer: COMMERCIAL

## 2024-01-18 PROCEDURE — RXMED WILLOW AMBULATORY MEDICATION CHARGE

## 2024-01-18 RX ORDER — ATOVAQUONE AND PROGUANIL HYDROCHLORIDE 250; 100 MG/1; MG/1
1 TABLET, FILM COATED ORAL DAILY
Qty: 20 TABLET | Refills: 0 | Status: SHIPPED | OUTPATIENT
Start: 2024-01-18 | End: 2024-02-07 | Stop reason: ALTCHOICE

## 2024-01-22 ENCOUNTER — PHARMACY VISIT (OUTPATIENT)
Dept: PHARMACY | Facility: CLINIC | Age: 44
End: 2024-01-22
Payer: COMMERCIAL

## 2024-01-22 NOTE — PROGRESS NOTES
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  The patient has not felt any breast lump and has no breast pain.  No nipple discharge or retraction.  No prior breast biopsy.  She has a family history of breast cancer in her sister who was diagnosed at age 45, paternal grandmother diagnosed at age 50 paternal aunt diagnosed at age 40.  She is uncertain if any had genetic testing.  Patient's age of menarche was 11.  G0, P0.  Last menstrual period December 20, 2023.    January 26, 2024  Patient follows up after having left breast stereotactic biopsy by interventional radiology on January 12, 2024.  No complaints regarding the biopsy.  No pain.        Past medical history:  Crohn's disease status post multiple operations: Right hemicolectomy at the age of 18.  Repair of vesicoenteric fistula.  Small bowel resection 4 years ago.  She is now maintained on Humira since 2020.  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  Hypertension     Allergies: Penicillin caused an unknown reaction as an infant  Past Medical History  She has a past medical history of Body mass index (BMI) 20.0-20.9, adult (01/27/2023), Crohn's disease of small intestine without complications (CMS/HCC) (03/29/2019), Disease due to severe acute respiratory syndrome coronavirus 2 (SARS-CoV-2) (01/26/2024), Ileostomy status (CMS/[facility]) (01/27/2023), Intestinal bypass and anastomosis status (01/27/2023), Iron deficiency anemia, unspecified (01/27/2023), Knee pain (01/26/2024), Lesion of right ear (01/26/2024), Lower abdominal pain, unspecified (03/24/2022), Neck pain (01/26/2024), and Skin lesion (01/26/2024).    Surgical History  She has a past surgical history that includes Other surgical history (01/24/2019); Other surgical history (01/08/2020); Other surgical history (12/27/2019); Other surgical history (11/04/2019); Other surgical history  "(11/04/2019); and Other surgical history (11/04/2019).     Allergies  Penicillins    Social History  She reports that she has never smoked. She has never used smokeless tobacco. She reports that she does not currently use alcohol. She reports that she does not use drugs.    Family History  Family History   Problem Relation Name Age of Onset    Breast cancer Sister      Breast cancer Maternal Grandmother      Breast cancer Other         Last Recorded Vitals  Blood pressure 114/68, pulse 52, temperature 36.4 °C (97.6 °F), resp. rate 20, height 1.549 m (5' 1\"), weight 51.7 kg (114 lb), SpO2 100 %.    Physical Exam  General: Well-developed, well-nourished, no acute distress, alert and oriented  Wound: Intact, no erythema or signs of infection  Left breast: No ecchymosis, wound healed, nontender, no mass    Relevant Results  Surgical Pathology Exam: [id number]  Order: [id number]  Collected 1/12/2024 09:32       Status: Final result       Visible to patient: Yes (seen)       Dx: Abnormal finding on breast imaging    0 Result Notes      Component    FINAL DIAGNOSIS   A. Left breast, 07:00, 7 cm from nipple, biopsy:    -- Benign fibrous breast tissue with no significant pathological findings, see note.      Note:  Clinical and radiologic correlation is needed to determine if biopsy is representative of lesion. Multiple deeper levels were reviewed.       : Dr Isabel Garner        Electronically signed by Nicolette Knapp MD on 1/16/2024 at 1343        Assessment/Plan   Diagnoses and all orders for this visit:  Abnormal mammogram      Status post left breast stereotactic biopsy by interventional radiology.  Pathology showed benign fibrous breast tissue with no significant pathological findings.  I discussed this with Dr. Alatorre who feels that the pathology is concordant with findings on imaging.  Recommend 6-month follow-up left breast mammogram and examination.           Ken Casillas MD  "

## 2024-01-24 ENCOUNTER — PHARMACY VISIT (OUTPATIENT)
Dept: PHARMACY | Facility: CLINIC | Age: 44
End: 2024-01-24
Payer: COMMERCIAL

## 2024-01-24 PROCEDURE — RXMED WILLOW AMBULATORY MEDICATION CHARGE

## 2024-01-26 ENCOUNTER — CLINICAL SUPPORT (OUTPATIENT)
Dept: INFECTIOUS DISEASES | Facility: CLINIC | Age: 44
End: 2024-01-26
Payer: COMMERCIAL

## 2024-01-26 ENCOUNTER — OFFICE VISIT (OUTPATIENT)
Dept: SURGERY | Facility: CLINIC | Age: 44
End: 2024-01-26
Payer: COMMERCIAL

## 2024-01-26 VITALS
WEIGHT: 114 LBS | RESPIRATION RATE: 20 BRPM | HEIGHT: 61 IN | OXYGEN SATURATION: 100 % | SYSTOLIC BLOOD PRESSURE: 114 MMHG | BODY MASS INDEX: 21.52 KG/M2 | DIASTOLIC BLOOD PRESSURE: 68 MMHG | HEART RATE: 52 BPM | TEMPERATURE: 97.6 F

## 2024-01-26 DIAGNOSIS — Z23 NEED FOR VACCINATION: ICD-10-CM

## 2024-01-26 DIAGNOSIS — R92.8 ABNORMAL MAMMOGRAM: Primary | ICD-10-CM

## 2024-01-26 PROBLEM — K50.90 CROHN'S DISEASE (MULTI): Status: ACTIVE | Noted: 2023-01-27

## 2024-01-26 PROBLEM — L98.9 SKIN LESION: Status: RESOLVED | Noted: 2024-01-26 | Resolved: 2024-01-26

## 2024-01-26 PROBLEM — M54.2 NECK PAIN: Status: RESOLVED | Noted: 2024-01-26 | Resolved: 2024-01-26

## 2024-01-26 PROBLEM — U07.1 DISEASE DUE TO SEVERE ACUTE RESPIRATORY SYNDROME CORONAVIRUS 2 (SARS-COV-2): Status: RESOLVED | Noted: 2024-01-26 | Resolved: 2024-01-26

## 2024-01-26 PROBLEM — H93.91 LESION OF RIGHT EAR: Status: RESOLVED | Noted: 2024-01-26 | Resolved: 2024-01-26

## 2024-01-26 PROBLEM — Z98.0 INTESTINAL BYPASS AND ANASTOMOSIS STATUS: Status: RESOLVED | Noted: 2023-01-27 | Resolved: 2024-01-26

## 2024-01-26 PROBLEM — M25.569 KNEE PAIN: Status: RESOLVED | Noted: 2024-01-26 | Resolved: 2024-01-26

## 2024-01-26 PROBLEM — Z93.2 ILEOSTOMY STATUS (MULTI): Status: RESOLVED | Noted: 2023-01-27 | Resolved: 2024-01-26

## 2024-01-26 PROBLEM — D50.9 IRON DEFICIENCY ANEMIA, UNSPECIFIED: Status: RESOLVED | Noted: 2023-01-27 | Resolved: 2024-01-26

## 2024-01-26 PROBLEM — H61.20 IMPACTED CERUMEN: Status: ACTIVE | Noted: 2023-10-11

## 2024-01-26 PROCEDURE — 3008F BODY MASS INDEX DOCD: CPT | Performed by: SURGERY

## 2024-01-26 PROCEDURE — 90471U01 YELLOW FEVER VACCINE SQ: Performed by: INTERNAL MEDICINE

## 2024-01-26 PROCEDURE — 1036F TOBACCO NON-USER: CPT | Performed by: SURGERY

## 2024-01-26 PROCEDURE — 99212 OFFICE O/P EST SF 10 MIN: CPT | Performed by: SURGERY

## 2024-01-26 PROCEDURE — 90717 YELLOW FEVER VACCINE SUBQ: CPT | Performed by: INTERNAL MEDICINE

## 2024-01-26 PROCEDURE — 90632 HEPA VACCINE ADULT IM: CPT | Performed by: INTERNAL MEDICINE

## 2024-01-26 PROCEDURE — 90471 IMMUNIZATION ADMIN: CPT | Performed by: INTERNAL MEDICINE

## 2024-01-26 RX ORDER — TRIAMCINOLONE ACETONIDE 0.25 MG/G
CREAM TOPICAL
COMMUNITY
Start: 2024-01-23 | End: 2024-04-24 | Stop reason: ALTCHOICE

## 2024-01-26 RX ORDER — KETOCONAZOLE 20 MG/G
CREAM TOPICAL
COMMUNITY
Start: 2024-01-23 | End: 2024-04-24 | Stop reason: ALTCHOICE

## 2024-01-26 ASSESSMENT — ENCOUNTER SYMPTOMS
OCCASIONAL FEELINGS OF UNSTEADINESS: 0
LOSS OF SENSATION IN FEET: 0
DEPRESSION: 0

## 2024-01-26 ASSESSMENT — PAIN SCALES - GENERAL: PAINLEVEL: 0-NO PAIN

## 2024-01-26 NOTE — LETTER
03/05/24    Dorothea Palumbo MD  [address]  [facility], [address]  [facility] [address]      Dear Dr. Dorothea Palumbo MD,    I am writing to confirm that your patient, Samina Javier, received care in my office on 03/05/24. I have enclosed a summary of the care provided to Samina for your reference.    Please contact me with any questions you may have regarding the visit.    Sincerely,         Lorie Sprign RN  [address]  [facility] [id number]    CC: No Recipients

## 2024-02-07 DIAGNOSIS — E55.9 VITAMIN D DEFICIENCY: Primary | ICD-10-CM

## 2024-02-07 PROCEDURE — RXMED WILLOW AMBULATORY MEDICATION CHARGE

## 2024-02-07 RX ORDER — ERGOCALCIFEROL 1.25 MG/1
CAPSULE ORAL
Qty: 6 CAPSULE | Refills: 3 | Status: SHIPPED | OUTPATIENT
Start: 2024-02-07

## 2024-02-12 ENCOUNTER — PHARMACY VISIT (OUTPATIENT)
Dept: PHARMACY | Facility: CLINIC | Age: 44
End: 2024-02-12
Payer: COMMERCIAL

## 2024-02-20 ENCOUNTER — PHARMACY VISIT (OUTPATIENT)
Dept: PHARMACY | Facility: CLINIC | Age: 44
End: 2024-02-20
Payer: COMMERCIAL

## 2024-02-20 PROCEDURE — RXMED WILLOW AMBULATORY MEDICATION CHARGE

## 2024-03-14 ENCOUNTER — PHARMACY VISIT (OUTPATIENT)
Dept: PHARMACY | Facility: CLINIC | Age: 44
End: 2024-03-14
Payer: COMMERCIAL

## 2024-03-14 PROCEDURE — RXMED WILLOW AMBULATORY MEDICATION CHARGE

## 2024-04-10 PROCEDURE — RXMED WILLOW AMBULATORY MEDICATION CHARGE

## 2024-04-11 ENCOUNTER — PHARMACY VISIT (OUTPATIENT)
Dept: PHARMACY | Facility: CLINIC | Age: 44
End: 2024-04-11
Payer: COMMERCIAL

## 2024-04-20 ENCOUNTER — HOSPITAL ENCOUNTER (EMERGENCY)
Facility: HOSPITAL | Age: 44
Discharge: HOME | End: 2024-04-20
Attending: STUDENT IN AN ORGANIZED HEALTH CARE EDUCATION/TRAINING PROGRAM
Payer: COMMERCIAL

## 2024-04-20 VITALS
TEMPERATURE: 97.7 F | SYSTOLIC BLOOD PRESSURE: 113 MMHG | RESPIRATION RATE: 15 BRPM | HEART RATE: 56 BPM | WEIGHT: 112 LBS | HEIGHT: 61 IN | OXYGEN SATURATION: 99 % | BODY MASS INDEX: 21.14 KG/M2 | DIASTOLIC BLOOD PRESSURE: 65 MMHG

## 2024-04-20 DIAGNOSIS — T78.40XA ALLERGIC REACTION, INITIAL ENCOUNTER: Primary | ICD-10-CM

## 2024-04-20 PROCEDURE — 2500000001 HC RX 250 WO HCPCS SELF ADMINISTERED DRUGS (ALT 637 FOR MEDICARE OP): Performed by: PHYSICIAN ASSISTANT

## 2024-04-20 PROCEDURE — 96374 THER/PROPH/DIAG INJ IV PUSH: CPT

## 2024-04-20 PROCEDURE — 2500000004 HC RX 250 GENERAL PHARMACY W/ HCPCS (ALT 636 FOR OP/ED): Performed by: STUDENT IN AN ORGANIZED HEALTH CARE EDUCATION/TRAINING PROGRAM

## 2024-04-20 PROCEDURE — 99284 EMERGENCY DEPT VISIT MOD MDM: CPT | Mod: 25

## 2024-04-20 RX ORDER — PREDNISONE 20 MG/1
40 TABLET ORAL DAILY
Qty: 8 TABLET | Refills: 0 | Status: SHIPPED | OUTPATIENT
Start: 2024-04-21 | End: 2024-04-24 | Stop reason: SDUPTHER

## 2024-04-20 RX ORDER — DIPHENHYDRAMINE HCL 25 MG
25 CAPSULE ORAL ONCE
Status: COMPLETED | OUTPATIENT
Start: 2024-04-20 | End: 2024-04-20

## 2024-04-20 RX ADMIN — METHYLPREDNISOLONE SODIUM SUCCINATE 125 MG: 125 INJECTION, POWDER, FOR SOLUTION INTRAMUSCULAR; INTRAVENOUS at 04:05

## 2024-04-20 RX ADMIN — DIPHENHYDRAMINE HYDROCHLORIDE 25 MG: 25 CAPSULE ORAL at 04:05

## 2024-04-20 ASSESSMENT — COLUMBIA-SUICIDE SEVERITY RATING SCALE - C-SSRS
2. HAVE YOU ACTUALLY HAD ANY THOUGHTS OF KILLING YOURSELF?: NO
1. IN THE PAST MONTH, HAVE YOU WISHED YOU WERE DEAD OR WISHED YOU COULD GO TO SLEEP AND NOT WAKE UP?: NO
6. HAVE YOU EVER DONE ANYTHING, STARTED TO DO ANYTHING, OR PREPARED TO DO ANYTHING TO END YOUR LIFE?: NO

## 2024-04-20 ASSESSMENT — PAIN - FUNCTIONAL ASSESSMENT: PAIN_FUNCTIONAL_ASSESSMENT: 0-10

## 2024-04-20 ASSESSMENT — LIFESTYLE VARIABLES
TOTAL SCORE: 0
EVER FELT BAD OR GUILTY ABOUT YOUR DRINKING: NO
HAVE PEOPLE ANNOYED YOU BY CRITICIZING YOUR DRINKING: NO
EVER HAD A DRINK FIRST THING IN THE MORNING TO STEADY YOUR NERVES TO GET RID OF A HANGOVER: NO
HAVE YOU EVER FELT YOU SHOULD CUT DOWN ON YOUR DRINKING: NO

## 2024-04-20 ASSESSMENT — PAIN SCALES - GENERAL
PAINLEVEL_OUTOF10: 0 - NO PAIN

## 2024-04-20 NOTE — ED PROVIDER NOTES
HPI   Chief Complaint   Patient presents with    Allergic Reaction     42 y/o female complains of hivves and facial swelling that began yesterday.       Past medical history that includes Crohn's on Humira as well as hypertension not on an ACE inhibitor presents with concerns of an allergic reaction.  States she had a sore throat the prior 2 days.  States that she noticed a rash on her trunk.  She then woke up with lip swelling that prompted her evaluation in the emergency department.  No new medications.  No new foods.  No new soaps or detergents.  Nobody else with similar contacts.  No known sick contacts.  Denies fevers, chills, chest pain, shortness of breath, difficulty swallowing secretions, nausea, and vomiting.      History provided by:  Patient   used: No                        [place] Coma Scale Score: 15                     Patient History   Past Medical History:   Diagnosis Date    Body mass index (BMI) 20.0-20.9, adult 01/27/2023    Crohn's disease of small intestine without complications (Multi) 03/29/2019    Crohn's disease of ileum    Disease due to severe acute respiratory syndrome coronavirus 2 (SARS-CoV-2) 01/26/2024    Ileostomy status (Multi) 01/27/2023    Intestinal bypass and anastomosis status 01/27/2023    Iron deficiency anemia, unspecified 01/27/2023    Knee pain 01/26/2024    Lesion of right ear 01/26/2024    Lower abdominal pain, unspecified 03/24/2022    Abdominal pain, lower    Neck pain 01/26/2024    Skin lesion 01/26/2024     Past Surgical History:   Procedure Laterality Date    OTHER SURGICAL HISTORY  01/24/2019    Ileocecal resection    OTHER SURGICAL HISTORY  01/08/2020    Small bowel resection    OTHER SURGICAL HISTORY  12/27/2019    Colectomy    OTHER SURGICAL HISTORY  11/04/2019    [place]ck[place] procedure    OTHER SURGICAL HISTORY  11/04/2019    Right hemicolectomy    OTHER SURGICAL HISTORY  11/04/2019    Ileostomy closure     Family History   Problem Relation Name  Age of Onset    Breast cancer Sister      Breast cancer Maternal Grandmother      Breast cancer Other       Social History     Tobacco Use    Smoking status: Never    Smokeless tobacco: Never   Substance Use Topics    Alcohol use: Not Currently    Drug use: Never       Physical Exam   ED Triage Vitals   Temp Pulse Resp BP   -- -- -- --      SpO2 Temp src Heart Rate Source Patient Position   -- -- -- --      BP Location FiO2 (%)     -- --       Physical Exam  Vitals and nursing note reviewed.   HENT:      Head: Atraumatic.      Mouth/Throat:      Mouth: Mucous membranes are moist.      Comments: No obvious facial swelling.  Very mild swelling of the lips.  No edema of the tongue.  Soft sublingual and submandibular spaces.  Uvula is midline.  No edema or erythema of the posterior pharynx.  No trismus or malocclusion.  Eyes:      Extraocular Movements: Extraocular movements intact.      Conjunctiva/sclera: Conjunctivae normal.      Pupils: Pupils are equal, round, and reactive to light.   Cardiovascular:      Rate and Rhythm: Normal rate and regular rhythm.   Pulmonary:      Effort: Pulmonary effort is normal.      Breath sounds: Normal breath sounds. No stridor. No wheezing.   Abdominal:      Palpations: Abdomen is soft.      Tenderness: There is no abdominal tenderness.   Musculoskeletal:         General: No deformity.      Cervical back: Normal range of motion.   Skin:     General: Skin is warm and dry.      Comments: Urticarial rash noted on the trunk and back.  Negative Nikolsky sign.  Erythema does doug with palpation.   Neurological:      Mental Status: She is alert.      Comments: Moving all extremities         ED Course & MDM   ED Course as of 04/20/24 0506   Sat Apr 20, 2024   0501 Feels improved on reevaluation.  Had a shared decision-making discussion regarding outpatient steroids.  Patient would like to complete a course of prednisone, which I do not think is unreasonable. [AB]      ED Course User  Index  [AB] Boaz Olmstead MD         Diagnoses as of 04/20/24 0506   Allergic reaction, initial encounter       Medical Decision Making  Clinical picture concerning for allergic reaction of unclear trigger.  Symptomatic improvement with Benadryl and Solu-Medrol in the emergency department.  As per the ED course, shared decision making discussion with the patient to pursue a course of prednisone.  Did consider other etiologies, such as viral exanthem, she did report a recent sore throat; however, lip swelling would not be expected.  Patient ultimately discharged with return precautions.    Amount and/or Complexity of Data Reviewed  External Data Reviewed: notes.     Details: Most recent primary care physician note as well as outpatient general surgery note    Risk  Prescription drug management.        Procedure  Procedures     Boaz Olmstead MD  04/20/24 0504

## 2024-04-23 NOTE — PROGRESS NOTES
Subjective   Patient ID: Samina Javier is a 43 y.o. female who presents for follow up hospital visit, Seen in the ED 4/20/2024 for hives and facial edema        She was seen in the ED 4/2024 for facial edema and hives  She has never had allergy testing   The day before she did go to a seafood restaurant but she eats seafood all the time   Her lips were so edematous it looked like she had filler done      She would like to discuss a lump in her throat and throat spasms   She woke with a ST and HA last Wednesday 4/17/2024 and she keeps having sinus drainage   She has the sensation that something is stuck in the back of her throat   Eating and drinking is fine.   She gets intermittent painful spasms in the throat and a lump. She can not feel the lump externally   She tried drinking more fluids and the spasms would not resolve  It finally resolved after a couple hours       HEALTH:  Pap more than 5 years ago, attempted 11/22 but failed, She saw Dr Xie in 2/23  Mammo 2016, 12/21, 12/22, 12/23 asymmetry medially in the left breast   Diag mammo and  US 12/23 confirm the area   - She had Bx 1/12/2024 Left breast, 07:00, 7 cm from nipple, biopsy: - Benign fibrous breast tissue with no significant pathological findings    BD never   Colon 10/20 single 2 mm erosion in izabella-TI. 1/23 normal and Q 3- 5  EKG 2019, 11/21, 10/23  Urine  Hep A 7/11/2023 and 1/26/2024  Hep B vaccine completed x 3   Flu 9/21, 10/22, 11/23  TDAP she will check for date   Prevnar never  Pneumo never  Shingrix 3/2020 and 8/2020   Moderna CVD 12/21 and 1/21 booster 11/21, 5/22, 11/22, 10/23  Ophth Last seen summer 2023. No history of glaucoma or MD. She wears glasses        Review of Systems  All systems negative except those listed in the HPI      Objective   LMP 04/17/2024 (Exact Date)   Visit Vitals  /60 (BP Location: Left arm, Patient Position: Sitting)   Pulse 62   Temp 36.2 °C (97.2 °F) (Temporal)    Body mass index is 21.92 kg/m².       Physical Exam  Vitals reviewed.   Constitutional:       Appearance: Normal appearance. She is normal weight.   HENT:      Head: Normocephalic.      Right Ear: Tympanic membrane, ear canal and external ear normal.      Left Ear: Tympanic membrane, ear canal and external ear normal.      Nose: Nose normal.      Mouth/Throat:      Pharynx: Oropharynx is clear.   Eyes:      Conjunctiva/sclera: Conjunctivae normal.   Cardiovascular:      Rate and Rhythm: Normal rate and regular rhythm.      Pulses: Normal pulses.      Heart sounds: Normal heart sounds.   Pulmonary:      Effort: Pulmonary effort is normal.      Breath sounds: Normal breath sounds.   Abdominal:      General: Bowel sounds are normal.      Palpations: Abdomen is soft.   Musculoskeletal:         General: Normal range of motion.      Cervical back: Normal range of motion and neck supple.   Skin:     General: Skin is warm.   Neurological:      General: No focal deficit present.      Mental Status: She is alert and oriented to person, place, and time.   Psychiatric:         Mood and Affect: Mood normal.         Behavior: Behavior normal.         Thought Content: Thought content normal.         Judgment: Judgment normal.        Assessment/Plan   Problem List Items Addressed This Visit       Crohn's disease (Multi)     Other Visit Diagnoses       Urticaria    -  Primary    Relevant Orders    Referral to Allergy    Oropharyngeal dysphagia        Relevant Orders    EGD    Allergic reaction, initial encounter        Relevant Medications    predniSONE (Deltasone) 20 mg tablet    Hypertension, essential        Relevant Medications    amLODIPine (Norvasc) 5 mg tablet           Hospital follow up and follow up completed  Hospital notes reviewed and medication reconciliation performed with patient   Reviewed labs and imaging from the hospital if applicable       She is single with no children. She is a vascular surgeon here at [employer]  She denies previous history of  tobacco use     Seen in the ED 4/20/2024 for urticaria and facial edema : no evidence of hives   She had a sore throat the prior 2 days. States that she noticed a rash on her trunk.   She then woke up with lip swelling that prompted her evaluation in the ED   Clinical picture concerning for allergic reaction of unclear trigger. Symptomatic improvement with Benadryl and Solu-Medrol in the emergency department.    She was seen in the ED 4/2024 for facial edema and hives  She has never had allergy testing   The day before she did go to a seafood restaurant but she eats seafood all the time    Her lips were so edematous it looked like she had fillers done   Prescription sent in for prednisone 20 mg to have on hand in case this occurs again   Recommend and orders placed for evaluation with an allergist 4/2024     Oropharyngeal dysphagia:   She would like to discuss a lump in her throat and throat spasms   She woke with a ST and HA last Wednesday 4/17/2024 and she has sinus drainage   She has the sensation that something is stuck in the back of her throat   Eating and drinking is fine.   She gets intermittent painful spasms in the throat   She has an intermittent lump in her throat, she can not feel the lump externally   She tried drinking more fluids and the spasms would not resolve  It finally resolved after a couple hours   Recommend and orders placed for EGD for further evaluation 4/2024  She will reach out to Dr Pierce to see if he wants to see her      She saw Dr Connors in ENT    She has no issues with her hearing to report       Her weight/ BMI is in normal range in office, recommend she maintain   Her weight is 116 pounds with BMI at 21.92  IO 4/2024   She walks her dog 3- 4 times a week      HTN, onset 11/2021:    Her mother started BP medications in her 70's   Her dad has CAD w/ stent.   Her sister is on amlodipine as well and had pericarditis last year 2021.   She feels stress is exacerbating her BP   Continue  Norvasc 10 mg daily and hydrochlorothiazide 12.5 mg daily   EKG 10/2023 was sinus rhythm   Renal artery US 11/2021 normal   ECHO 11/2021 was normal   Recommend she monitor her BP at home and call with elevated readings      We discussed the patients cardiovascular risk. If needed, lifestyle modifications recommended including: behavioral therapies of nutrition choices, exercise and eliminate habits that are contributing to their cardiac risk. We agreed to a plan to decrease his cardiovascular risks. Discussed ASA. Reviewed Guidelines and approved recommendations made to patient.   The patient's 10 yr CV risk was estimated at  0.4% 4/2024      Crohn disease: Stable   She has struggled with this since age 14.    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 Extensive lysis of adhesions interloop, abdominal wall and retroperitoneal, hepatic, drainage of intra- abdominal abscess, small bowel resection and stricturoplasty with repair of enterotomies on 12/9/2019 with Dr Reeves   MRI 2017 showed 7 cm of disease   Stopped Remicade in 10/2018  MRI Enterography 6/2022 showed post surgical changes c/w Cuero's procedure and ileocolonic resection with primary anastomosis. No evidence of thickening or enhancement to suggest an acute Crohn's flare. Mild central intrahepatic biliary dilation with slight irregularity of the dilated duct predominantly in the left lobe without definite stricture. Cholelithiasis with gallstone in the neck of the gallbladder with abutting the common hepatic duct. Subseptate uterus with a fibroid. Hemorrhagic right ovarian cyst   Colonoscopy 1/2023 normal and Q 3- 5    She has hyoscyamine to use prn abdominal pain and cramping   Continue Humira 40 mg SQ Q 14 days and loperamide 1- 2 tablets preprandial  MAYURI  She saw Dr Pierce in 9/2023 and follow in a year      Intermittent right shoulder pain:    Sometimes the pain is really bad. Getting a massage will help the shoulder  She started going to the stretch lab and that has helped   Recommend and orders placed for PT and discuss dry needling 11/2023     Intermittent paresthesia and burning pain in the lateral left knee:    Xray left knee 12/2023 negative for acute process   Recommend and orders placed for PT 11/2023     Eczema auricle right ear and scabbed lesion on the neck:   She complains of an area on her ear that has been present for a few years  It scabs over and keeps coming back. She is unsure why it will not heal completely   Recommend and orders placed for dermatology for further evaluation 11/2023     Vitamin D def:  Continue scripted Vitamin D 50 K UT twice weekly   Continue Vitamin B 12 daily      Pap more than 5 years ago. She saw Dr Xie in 2/2023.  Pap attempted but failed due to tight pelvic structures > right 11/2022.    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 There is extensive abdominal scar tissue that limits exam. The endometrium is suboptimally visualized and appears to be prominent. The right ovary is subvisualized.  The left ovary is not visualized. The bladder is not well distended. Transvaginal could not be done due to patient's inability to tolerate.   Recommend hysteroscopy and D&C 2/2023. She was supposed to have the hysteroscopy and D&C in 5/2023 but there was issues with the schedules.    She will see Dr Xie again in 2/2024 and they will discuss hysteroscopy and D&C then     Mammo normal in 12/2022 and ordered 11/2023..   Breast exam normal except right breast more nodular than left 11/2022   family history of breast cancer in her sister who was diagnosed at age 45, paternal grandmother diagnosed at age 50 paternal aunt diagnosed at age 40.     She saw Dr Casillas 1/2024 for abnormal mammogram    She had Bx 1/12/2024 Left breast, 07:00, 7 cm from nipple, biopsy:    -- Benign fibrous breast tissue with no significant pathological findings        BD never   Colonoscopy 1/2023 normal and Q 3- 5      Ophth:  Last seen summer 2023. No history of glaucoma or MD. She wears glasses        Hep A 7/11/2023 and 1/26/2024  Hep B vaccine completed x 3   Flu 9/21, 10/22, 11/23  TDAP she will check for date   Prevnar never  Pneumo never  Shingrix 3/2020 and 8/2020   Moderna CVD 12/21 and 1/21 booster 11/21, 5/22, 11/22, 10/23      Some elements in the chart were copied from Dr. Palumbo's last office visit with patient.   Notes have been updated where appropriate, and reflect my current medical decision making from today.       Return in 1 year for CPE or sooner if needed   (CPE due 11/2024)      Scribe Attestation  By signing my name below, I, Kathy Quispe , Scribe   attest that this documentation has been prepared under the direction and in the presence of Dorothea Palumbo MD.

## 2024-04-24 ENCOUNTER — PHARMACY VISIT (OUTPATIENT)
Dept: PHARMACY | Facility: CLINIC | Age: 44
End: 2024-04-24
Payer: COMMERCIAL

## 2024-04-24 ENCOUNTER — OFFICE VISIT (OUTPATIENT)
Dept: PRIMARY CARE | Facility: CLINIC | Age: 44
End: 2024-04-24
Payer: COMMERCIAL

## 2024-04-24 VITALS
WEIGHT: 116 LBS | SYSTOLIC BLOOD PRESSURE: 120 MMHG | HEART RATE: 62 BPM | TEMPERATURE: 97.2 F | BODY MASS INDEX: 21.9 KG/M2 | HEIGHT: 61 IN | DIASTOLIC BLOOD PRESSURE: 60 MMHG | OXYGEN SATURATION: 98 %

## 2024-04-24 DIAGNOSIS — K50.10 CROHN'S DISEASE OF LARGE INTESTINE WITHOUT COMPLICATION (MULTI): ICD-10-CM

## 2024-04-24 DIAGNOSIS — T78.40XA ALLERGIC REACTION, INITIAL ENCOUNTER: ICD-10-CM

## 2024-04-24 DIAGNOSIS — L50.9 URTICARIA: Primary | ICD-10-CM

## 2024-04-24 DIAGNOSIS — R13.12 DYSPHAGIA, OROPHARYNGEAL: Primary | ICD-10-CM

## 2024-04-24 DIAGNOSIS — I10 HYPERTENSION, ESSENTIAL: ICD-10-CM

## 2024-04-24 DIAGNOSIS — R13.12 OROPHARYNGEAL DYSPHAGIA: ICD-10-CM

## 2024-04-24 PROCEDURE — 3074F SYST BP LT 130 MM HG: CPT | Performed by: INTERNAL MEDICINE

## 2024-04-24 PROCEDURE — 1036F TOBACCO NON-USER: CPT | Performed by: INTERNAL MEDICINE

## 2024-04-24 PROCEDURE — 3078F DIAST BP <80 MM HG: CPT | Performed by: INTERNAL MEDICINE

## 2024-04-24 PROCEDURE — RXMED WILLOW AMBULATORY MEDICATION CHARGE

## 2024-04-24 PROCEDURE — 99214 OFFICE O/P EST MOD 30 MIN: CPT | Performed by: INTERNAL MEDICINE

## 2024-04-24 RX ORDER — PANTOPRAZOLE SODIUM 40 MG/1
40 TABLET, DELAYED RELEASE ORAL DAILY
Qty: 30 TABLET | Refills: 3 | Status: SHIPPED | OUTPATIENT
Start: 2024-04-24 | End: 2025-04-24

## 2024-04-24 RX ORDER — PREDNISONE 20 MG/1
40 TABLET ORAL DAILY
Qty: 8 TABLET | Refills: 2 | Status: SHIPPED | OUTPATIENT
Start: 2024-04-24 | End: 2024-05-06

## 2024-04-24 RX ORDER — AMLODIPINE BESYLATE 5 MG/1
5 TABLET ORAL DAILY
Qty: 100 TABLET | Refills: 3 | Status: SHIPPED | OUTPATIENT
Start: 2024-04-24 | End: 2025-04-24

## 2024-04-24 ASSESSMENT — PATIENT HEALTH QUESTIONNAIRE - PHQ9
1. LITTLE INTEREST OR PLEASURE IN DOING THINGS: NOT AT ALL
SUM OF ALL RESPONSES TO PHQ9 QUESTIONS 1 AND 2: 0
2. FEELING DOWN, DEPRESSED OR HOPELESS: NOT AT ALL

## 2024-04-25 ENCOUNTER — PHARMACY VISIT (OUTPATIENT)
Dept: PHARMACY | Facility: CLINIC | Age: 44
End: 2024-04-25
Payer: COMMERCIAL

## 2024-04-25 PROCEDURE — RXMED WILLOW AMBULATORY MEDICATION CHARGE

## 2024-04-29 PROCEDURE — RXMED WILLOW AMBULATORY MEDICATION CHARGE

## 2024-05-01 ENCOUNTER — TELEPHONE (OUTPATIENT)
Dept: GASTROENTEROLOGY | Facility: HOSPITAL | Age: 44
End: 2024-05-01
Payer: COMMERCIAL

## 2024-05-02 ENCOUNTER — PHARMACY VISIT (OUTPATIENT)
Dept: PHARMACY | Facility: CLINIC | Age: 44
End: 2024-05-02
Payer: COMMERCIAL

## 2024-05-06 PROCEDURE — RXMED WILLOW AMBULATORY MEDICATION CHARGE

## 2024-05-07 PROCEDURE — RXMED WILLOW AMBULATORY MEDICATION CHARGE

## 2024-05-08 ENCOUNTER — PHARMACY VISIT (OUTPATIENT)
Dept: PHARMACY | Facility: CLINIC | Age: 44
End: 2024-05-08
Payer: COMMERCIAL

## 2024-05-09 ENCOUNTER — PHARMACY VISIT (OUTPATIENT)
Dept: PHARMACY | Facility: CLINIC | Age: 44
End: 2024-05-09
Payer: COMMERCIAL

## 2024-05-13 ENCOUNTER — TELEPHONE (OUTPATIENT)
Dept: GASTROENTEROLOGY | Facility: HOSPITAL | Age: 44
End: 2024-05-13
Payer: COMMERCIAL

## 2024-05-20 DIAGNOSIS — R13.12 OROPHARYNGEAL DYSPHAGIA: Primary | ICD-10-CM

## 2024-05-21 ENCOUNTER — TELEPHONE (OUTPATIENT)
Dept: GASTROENTEROLOGY | Facility: HOSPITAL | Age: 44
End: 2024-05-21
Payer: COMMERCIAL

## 2024-05-30 PROCEDURE — RXMED WILLOW AMBULATORY MEDICATION CHARGE

## 2024-05-31 ENCOUNTER — PHARMACY VISIT (OUTPATIENT)
Dept: PHARMACY | Facility: CLINIC | Age: 44
End: 2024-05-31
Payer: COMMERCIAL

## 2024-06-03 PROCEDURE — RXMED WILLOW AMBULATORY MEDICATION CHARGE

## 2024-06-06 ENCOUNTER — PHARMACY VISIT (OUTPATIENT)
Dept: PHARMACY | Facility: CLINIC | Age: 44
End: 2024-06-06
Payer: COMMERCIAL

## 2024-06-27 PROCEDURE — RXMED WILLOW AMBULATORY MEDICATION CHARGE

## 2024-07-01 ENCOUNTER — PHARMACY VISIT (OUTPATIENT)
Dept: PHARMACY | Facility: CLINIC | Age: 44
End: 2024-07-01
Payer: COMMERCIAL

## 2024-07-10 NOTE — PROGRESS NOTES
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  The patient has not felt any breast lump and has no breast pain.  No nipple discharge or retraction.  No prior breast biopsy.  She has a family history of breast cancer in her sister who was diagnosed at age 45, paternal grandmother diagnosed at age 50 paternal aunt diagnosed at age 40.  She is uncertain if any had genetic testing.  Patient's age of menarche was 11.  G0, P0.  Last menstrual period December 20, 2023.       July 12, 2024  The patient had left breast stereotactic biopsy by interventional radiology on January 12, 2024.  Pathology showed benign fibrous breast tissue with no significant pathologic findings.  Radiology felt that the pathology report was concordant with imaging.  She follows up after having repeat left breast mammogram.  She has not felt any breast lumps and has no breast pain.  No nipple discharge or retraction.        Past medical history:  Crohn's disease status post multiple operations: Right hemicolectomy at the age of 18.  Repair of vesicoenteric fistula.  Small bowel resection 4 years ago.  She is now maintained on Humira since 2020.  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  Hypertension     Allergies: Penicillin caused an unknown reaction as an infant    Past Medical History  She has a past medical history of Body mass index (BMI) 20.0-20.9, adult (01/27/2023), Crohn's disease of small intestine without complications (Multi) (03/29/2019), Disease due to severe acute respiratory syndrome coronavirus 2 (SARS-CoV-2) (01/26/2024), Ileostomy status (Multi) (01/27/2023), Intestinal bypass and anastomosis status (01/27/2023), Iron deficiency anemia, unspecified (01/27/2023), Knee pain (01/26/2024), Lesion of right ear (01/26/2024), Lower abdominal pain, unspecified (03/24/2022), Neck pain (01/26/2024), and Skin lesion  "(01/26/2024).    Surgical History  She has a past surgical history that includes Other surgical history (01/24/2019); Other surgical history (01/08/2020); Other surgical history (12/27/2019); Other surgical history (11/04/2019); Other surgical history (11/04/2019); and Other surgical history (11/04/2019).     Allergies  Penicillins    Social History  She reports that she has never smoked. She has never used smokeless tobacco. She reports that she does not currently use alcohol. She reports that she does not use drugs.    Family History  Family History   Problem Relation Name Age of Onset    Breast cancer Sister      Breast cancer Maternal Grandmother      Breast cancer Other         Last Recorded Vitals  Blood pressure 130/79, pulse 56, temperature 36.4 °C (97.6 °F), resp. rate 18, height 1.549 m (5' 1\"), weight 50.8 kg (112 lb), SpO2 98%.    Physical Exam  Constitutional: Well-developed, well-nourished, alert and oriented, no acute distress  Skin: Warm and dry, no lesions, no rashes, no jaundice  HEENT: Normocephalic, atraumatic, EOMI, no scleral icterus, mucous membranes moist, no lesions seen  Neck: Soft, nontender, no mass or adenopathy  Cardiac: Regular rate and rhythm, no murmur  Chest: Patent airway, clear to auscultation, normal breath sounds with good chest expansion, no wheezes or rales or rhonchi noted, thorax symmetric  Breast:     right breast: No skin changes, nontender, no mass, mild fibrocystic change    left breast: No skin changes, nontender, no mass, mild fibrocystic change  Abdomen: Nondistended, soft, nontender, no mass  Rectal: Not performed  Extremities: No injury, no lower extremity edema or calf tenderness  Lymphatic: No cervical or axillary adenopathy  Musculoskeletal: Range of motion intact, no joint swelling, normal strength  Neurological: Alert and oriented x3, intact sensory and motor function, no obvious focal neurologic abnormalities  Psychological: Appropriate mood and " behavior  Examination chaperoned by Joyce    Relevant Results  I reviewed the left breast mammogram report and images from today.  IMPRESSION:  No mammographic evidence of malignancy.  BI-RADS Category:  2 Benign.  Recommendation:  Annual Screening.  Recommended Date:  1 Year.  Laterality:  Bilateral.    Assessment/Plan   Diagnoses and all orders for this visit:  Fibrocystic breast changes of both breasts  No breast mass on examination.  Left breast mammogram showed benign findings.  Recommend normal yearly bilateral screening mammogram in December.  Yearly breast examination which could be performed by the patient's primary care physician.  Breast self-exam monthly.  Follow-up with me as needed.        Ken Casillas MD

## 2024-07-12 ENCOUNTER — HOSPITAL ENCOUNTER (OUTPATIENT)
Dept: RADIOLOGY | Facility: CLINIC | Age: 44
Discharge: HOME | End: 2024-07-12
Payer: COMMERCIAL

## 2024-07-12 ENCOUNTER — OFFICE VISIT (OUTPATIENT)
Dept: SURGERY | Facility: CLINIC | Age: 44
End: 2024-07-12
Payer: COMMERCIAL

## 2024-07-12 VITALS
HEIGHT: 61 IN | OXYGEN SATURATION: 98 % | BODY MASS INDEX: 21.14 KG/M2 | HEART RATE: 56 BPM | TEMPERATURE: 97.6 F | RESPIRATION RATE: 18 BRPM | WEIGHT: 112 LBS | DIASTOLIC BLOOD PRESSURE: 79 MMHG | SYSTOLIC BLOOD PRESSURE: 130 MMHG

## 2024-07-12 DIAGNOSIS — R92.8 ABNORMAL MAMMOGRAM: ICD-10-CM

## 2024-07-12 DIAGNOSIS — N60.12 FIBROCYSTIC BREAST CHANGES OF BOTH BREASTS: Primary | ICD-10-CM

## 2024-07-12 DIAGNOSIS — N60.11 FIBROCYSTIC BREAST CHANGES OF BOTH BREASTS: Primary | ICD-10-CM

## 2024-07-12 PROCEDURE — 1036F TOBACCO NON-USER: CPT | Performed by: SURGERY

## 2024-07-12 PROCEDURE — 99213 OFFICE O/P EST LOW 20 MIN: CPT | Performed by: SURGERY

## 2024-07-12 PROCEDURE — 77061 BREAST TOMOSYNTHESIS UNI: CPT | Mod: LT

## 2024-07-12 ASSESSMENT — ENCOUNTER SYMPTOMS
LOSS OF SENSATION IN FEET: 0
DEPRESSION: 0
OCCASIONAL FEELINGS OF UNSTEADINESS: 0

## 2024-07-12 ASSESSMENT — PAIN SCALES - GENERAL: PAINLEVEL: 0-NO PAIN

## 2024-07-12 NOTE — LETTER
July 12, 2024     Dorothea Palumbo MD  [address]  [facility]  [facility] [address]    Patient: Samina Javier   YOB: 1980   Date of Visit: 7/12/2024       Dear Dr. Dorothea Palumbo MD:    Thank you for referring Samina Javier to me for evaluation. Below are my notes for this consultation.  If you have questions, please do not hesitate to call me. I look forward to following your patient along with you.       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  The patient has not felt any breast lump and has no breast pain.  No nipple discharge or retraction.  No prior breast biopsy.  She has a family history of breast cancer in her sister who was diagnosed at age 45, paternal grandmother diagnosed at age 50 paternal aunt diagnosed at age 40.  She is uncertain if any had genetic testing.  Patient's age of menarche was 11.  G0, P0.  Last menstrual period December 20, 2023.       July 12, 2024  The patient had left breast stereotactic biopsy by interventional radiology on January 12, 2024.  Pathology showed benign fibrous breast tissue with no significant pathologic findings.  Radiology felt that the pathology report was concordant with imaging.  She follows up after having repeat left breast mammogram.  She has not felt any breast lumps and has no breast pain.  No nipple discharge or retraction.        Past medical history:  Crohn's disease status post multiple operations: Right hemicolectomy at the age of 18.  Repair of vesicoenteric fistula.  Small bowel resection 4 years ago.  She is now maintained on Humira since 2020.  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  Hypertension     Allergies: Penicillin  "caused an unknown reaction as an infant    Past Medical History  She has a past medical history of Body mass index (BMI) 20.0-20.9, adult (01/27/2023), Crohn's disease of small intestine without complications (Multi) (03/29/2019), Disease due to severe acute respiratory syndrome coronavirus 2 (SARS-CoV-2) (01/26/2024), Ileostomy status (Multi) (01/27/2023), Intestinal bypass and anastomosis status (01/27/2023), Iron deficiency anemia, unspecified (01/27/2023), Knee pain (01/26/2024), Lesion of right ear (01/26/2024), Lower abdominal pain, unspecified (03/24/2022), Neck pain (01/26/2024), and Skin lesion (01/26/2024).    Surgical History  She has a past surgical history that includes Other surgical history (01/24/2019); Other surgical history (01/08/2020); Other surgical history (12/27/2019); Other surgical history (11/04/2019); Other surgical history (11/04/2019); and Other surgical history (11/04/2019).     Allergies  Penicillins    Social History  She reports that she has never smoked. She has never used smokeless tobacco. She reports that she does not currently use alcohol. She reports that she does not use drugs.    Family History  Family History   Problem Relation Name Age of Onset   • Breast cancer Sister     • Breast cancer Maternal Grandmother     • Breast cancer Other         Last Recorded Vitals  Blood pressure 130/79, pulse 56, temperature 36.4 °C (97.6 °F), resp. rate 18, height 1.549 m (5' 1\"), weight 50.8 kg (112 lb), SpO2 98%.    Physical Exam  Constitutional: Well-developed, well-nourished, alert and oriented, no acute distress  Skin: Warm and dry, no lesions, no rashes, no jaundice  HEENT: Normocephalic, atraumatic, EOMI, no scleral icterus, mucous membranes moist, no lesions seen  Neck: Soft, nontender, no mass or adenopathy  Cardiac: Regular rate and rhythm, no murmur  Chest: Patent airway, clear to auscultation, normal breath sounds with good chest expansion, no wheezes or rales or rhonchi noted, " thorax symmetric  Breast:     right breast: No skin changes, nontender, no mass, mild fibrocystic change    left breast: No skin changes, nontender, no mass, mild fibrocystic change  Abdomen: Nondistended, soft, nontender, no mass  Rectal: Not performed  Extremities: No injury, no lower extremity edema or calf tenderness  Lymphatic: No cervical or axillary adenopathy  Musculoskeletal: Range of motion intact, no joint swelling, normal strength  Neurological: Alert and oriented x3, intact sensory and motor function, no obvious focal neurologic abnormalities  Psychological: Appropriate mood and behavior  Examination chaperoned by Joyce    Relevant Results  I reviewed the left breast mammogram report and images from today.  IMPRESSION:  No mammographic evidence of malignancy.  BI-RADS Category:  2 Benign.  Recommendation:  Annual Screening.  Recommended Date:  1 Year.  Laterality:  Bilateral.    Assessment/Plan  Diagnoses and all orders for this visit:  Fibrocystic breast changes of both breasts  No breast mass on examination.  Left breast mammogram showed benign findings.  Recommend normal yearly bilateral screening mammogram in December.  Yearly breast examination which could be performed by the patient's primary care physician.  Breast self-exam monthly.  Follow-up with me as needed.        Ken Casillas MD

## 2024-07-22 PROCEDURE — RXMED WILLOW AMBULATORY MEDICATION CHARGE

## 2024-07-24 PROCEDURE — RXMED WILLOW AMBULATORY MEDICATION CHARGE

## 2024-07-25 ENCOUNTER — PHARMACY VISIT (OUTPATIENT)
Dept: PHARMACY | Facility: CLINIC | Age: 44
End: 2024-07-25
Payer: COMMERCIAL

## 2024-08-09 ENCOUNTER — ANESTHESIA (OUTPATIENT)
Dept: GASTROENTEROLOGY | Facility: HOSPITAL | Age: 44
End: 2024-08-09
Payer: COMMERCIAL

## 2024-08-09 ENCOUNTER — ANESTHESIA EVENT (OUTPATIENT)
Dept: GASTROENTEROLOGY | Facility: HOSPITAL | Age: 44
End: 2024-08-09
Payer: COMMERCIAL

## 2024-08-09 ENCOUNTER — HOSPITAL ENCOUNTER (OUTPATIENT)
Dept: GASTROENTEROLOGY | Facility: HOSPITAL | Age: 44
Setting detail: OUTPATIENT SURGERY
Discharge: HOME | End: 2024-08-09
Payer: COMMERCIAL

## 2024-08-09 VITALS
TEMPERATURE: 97.2 F | RESPIRATION RATE: 13 BRPM | DIASTOLIC BLOOD PRESSURE: 80 MMHG | HEART RATE: 60 BPM | BODY MASS INDEX: 21.35 KG/M2 | HEIGHT: 61 IN | WEIGHT: 113.1 LBS | SYSTOLIC BLOOD PRESSURE: 135 MMHG | OXYGEN SATURATION: 99 %

## 2024-08-09 DIAGNOSIS — R13.12 OROPHARYNGEAL DYSPHAGIA: ICD-10-CM

## 2024-08-09 PROBLEM — K21.9 GASTROESOPHAGEAL REFLUX DISEASE: Status: ACTIVE | Noted: 2024-08-09

## 2024-08-09 LAB — PREGNANCY TEST URINE, POC: NEGATIVE

## 2024-08-09 PROCEDURE — 2500000004 HC RX 250 GENERAL PHARMACY W/ HCPCS (ALT 636 FOR OP/ED)

## 2024-08-09 PROCEDURE — 7100000010 HC PHASE TWO TIME - EACH INCREMENTAL 1 MINUTE

## 2024-08-09 PROCEDURE — 7100000009 HC PHASE TWO TIME - INITIAL BASE CHARGE

## 2024-08-09 PROCEDURE — 3700000002 HC GENERAL ANESTHESIA TIME - EACH INCREMENTAL 1 MINUTE

## 2024-08-09 PROCEDURE — 81025 URINE PREGNANCY TEST: CPT | Performed by: INTERNAL MEDICINE

## 2024-08-09 PROCEDURE — 2500000004 HC RX 250 GENERAL PHARMACY W/ HCPCS (ALT 636 FOR OP/ED): Performed by: INTERNAL MEDICINE

## 2024-08-09 PROCEDURE — 43239 EGD BIOPSY SINGLE/MULTIPLE: CPT | Performed by: INTERNAL MEDICINE

## 2024-08-09 PROCEDURE — 3700000001 HC GENERAL ANESTHESIA TIME - INITIAL BASE CHARGE

## 2024-08-09 RX ORDER — SODIUM CHLORIDE, SODIUM LACTATE, POTASSIUM CHLORIDE, CALCIUM CHLORIDE 600; 310; 30; 20 MG/100ML; MG/100ML; MG/100ML; MG/100ML
20 INJECTION, SOLUTION INTRAVENOUS CONTINUOUS
Status: DISCONTINUED | OUTPATIENT
Start: 2024-08-09 | End: 2024-08-10 | Stop reason: HOSPADM

## 2024-08-09 RX ORDER — MIDAZOLAM HYDROCHLORIDE 1 MG/ML
INJECTION INTRAMUSCULAR; INTRAVENOUS AS NEEDED
Status: DISCONTINUED | OUTPATIENT
Start: 2024-08-09 | End: 2024-08-09

## 2024-08-09 RX ORDER — PROPOFOL 10 MG/ML
INJECTION, EMULSION INTRAVENOUS CONTINUOUS PRN
Status: DISCONTINUED | OUTPATIENT
Start: 2024-08-09 | End: 2024-08-09

## 2024-08-09 RX ORDER — ONDANSETRON HYDROCHLORIDE 2 MG/ML
4 INJECTION, SOLUTION INTRAVENOUS ONCE AS NEEDED
Status: DISCONTINUED | OUTPATIENT
Start: 2024-08-09 | End: 2024-08-10 | Stop reason: HOSPADM

## 2024-08-09 ASSESSMENT — PAIN SCALES - GENERAL
PAINLEVEL_OUTOF10: 0 - NO PAIN
PAINLEVEL_OUTOF10: 0 - NO PAIN
PAIN_LEVEL: 0
PAINLEVEL_OUTOF10: 0 - NO PAIN

## 2024-08-09 ASSESSMENT — PAIN - FUNCTIONAL ASSESSMENT
PAIN_FUNCTIONAL_ASSESSMENT: UNABLE TO SELF-REPORT
PAIN_FUNCTIONAL_ASSESSMENT: 0-10

## 2024-08-09 ASSESSMENT — COLUMBIA-SUICIDE SEVERITY RATING SCALE - C-SSRS
1. IN THE PAST MONTH, HAVE YOU WISHED YOU WERE DEAD OR WISHED YOU COULD GO TO SLEEP AND NOT WAKE UP?: NO
2. HAVE YOU ACTUALLY HAD ANY THOUGHTS OF KILLING YOURSELF?: NO
6. HAVE YOU EVER DONE ANYTHING, STARTED TO DO ANYTHING, OR PREPARED TO DO ANYTHING TO END YOUR LIFE?: NO

## 2024-08-09 NOTE — ANESTHESIA POSTPROCEDURE EVALUATION
Patient: Samina Javier    Procedure Summary       Date: 08/09/24 Room / Location: [facility]    Anesthesia Start: 0940 Anesthesia Stop: 1004    Procedure: EGD Diagnosis: Oropharyngeal dysphagia    Scheduled Providers: Flako Pierce MD; MICHAEL Zambrano; Juan Castaneda MD; Polo Bhagat RN; Patricia Washington RN Responsible Provider: Juan Castaneda MD    Anesthesia Type: MAC ASA Status: 2            Anesthesia Type: MAC    Vitals Value Taken Time   /78 08/09/24 1028   Temp 36.2 °C (97.2 °F) 08/09/24 0958   Pulse 75 08/09/24 1028   Resp 14 08/09/24 1028   SpO2 100 % 08/09/24 1028       Anesthesia Post Evaluation    Patient location during evaluation: bedside  Patient participation: complete - patient cannot participate  Level of consciousness: awake  Pain score: 0  Pain management: adequate  Airway patency: patent  Cardiovascular status: acceptable  Respiratory status: acceptable  Hydration status: acceptable  Postoperative Nausea and Vomiting: none        No notable events documented.

## 2024-08-09 NOTE — H&P
History Of Present Illness  Samina Javier is a 43 y.o. female presenting with intermittent dysphagia over 4 months for upper endoscopy.  (+) seasonal allergies (+) Crohn's disease.     Past Medical History  Past Medical History:   Diagnosis Date    Body mass index (BMI) 20.0-20.9, adult 01/27/2023    Crohn's disease of small intestine without complications (Multi) 03/29/2019    Crohn's disease of ileum    Disease due to severe acute respiratory syndrome coronavirus 2 (SARS-CoV-2) 01/26/2024    Ileostomy status (Multi) 01/27/2023    Intestinal bypass and anastomosis status 01/27/2023    Iron deficiency anemia, unspecified 01/27/2023    Knee pain 01/26/2024    Lesion of right ear 01/26/2024    Lower abdominal pain, unspecified 03/24/2022    Abdominal pain, lower    Neck pain 01/26/2024    Skin lesion 01/26/2024     Surgical History  Past Surgical History:   Procedure Laterality Date    OTHER SURGICAL HISTORY  01/24/2019    Ileocecal resection    OTHER SURGICAL HISTORY  01/08/2020    Small bowel resection    OTHER SURGICAL HISTORY  12/27/2019    Colectomy    OTHER SURGICAL HISTORY  11/04/2019    Caldwell procedure    OTHER SURGICAL HISTORY  11/04/2019    Right hemicolectomy    OTHER SURGICAL HISTORY  11/04/2019    Ileostomy closure     Social History  She reports that she has never smoked. She has never used smokeless tobacco. She reports that she does not currently use alcohol. She reports that she does not use drugs.    Family History  Family History   Problem Relation Name Age of Onset    Breast cancer Sister      Breast cancer Maternal Grandmother      Breast cancer Other          Allergies  Allergies   Allergen Reactions    Penicillins Hives and Rash     Other reaction(s): Unknown     Review of Systems     Physical Exam   Vital signs reviewed  Patient alert and oriented in no acute distress  Cardiac exam regular rate and rhythm S1-S2 without murmurs gallops or rubs  Lungs clear to auscultation bilaterally  Abdomen  "soft and nontender    Last Recorded Vitals  Blood pressure 135/72, pulse 62, temperature 36 °C (96.8 °F), temperature source Temporal, resp. rate 16, height 1.549 m (5' 1\"), weight 51.3 kg (113 lb 1.5 oz), last menstrual period 07/26/2024, SpO2 100%.    Assessment/Plan   EGD to evaluate dysphagia.     Flako Pierce MD  "

## 2024-08-09 NOTE — ANESTHESIA PREPROCEDURE EVALUATION
Patient: Samina Javier    Procedure Information       Date/Time: 08/09/24 0930    Scheduled providers: Flako Pierce MD; MICHAEL Zambrano; Juan Castaneda MD; Polo Bhagat RN; Patricia Washington RN    Procedure: EGD    Location: [facility]            Relevant Problems   Anesthesia (within normal limits)      Cardiac   (+) HTN (hypertension)      Pulmonary (within normal limits)      GI   (+) Crohn's disease (Multi)   (+) Gastroesophageal reflux disease      /Renal (within normal limits)      Liver (within normal limits)      Hematology   (+) Iron deficiency anemia       Clinical information reviewed:   Tobacco  Allergies  Meds   Med Hx  Surg Hx  OB Status  Fam Hx  Soc   Hx        NPO Detail:  NPO/Void Status  Carbohydrate Drink Given Prior to Surgery? : N  Date of Last Liquid: 08/08/24  Time of Last Liquid: 2200  Date of Last Solid: 08/08/24  Time of Last Solid: 2200  Last Intake Type: Clear fluids  Time of Last Void: 0800         Physical Exam    Airway  Mallampati: II     Cardiovascular    Dental        Pulmonary    Abdominal            Anesthesia Plan    History of general anesthesia?: yes  History of complications of general anesthesia?: no    ASA 2     MAC     intravenous induction   Anesthetic plan and risks discussed with patient.

## 2024-08-11 PROBLEM — H61.20 IMPACTED CERUMEN: Status: RESOLVED | Noted: 2023-10-11 | Resolved: 2024-08-11

## 2024-08-11 NOTE — PROGRESS NOTES
Subjective   Patient ID:   [id number]   Samina Javier is a 43 y.o. female who presents for Hives and Angioedema.    Chief Complaint   Patient presents with    Hives    Angioedema      This is a new patient, with a H/O GERD, Crohn's disease, congenital malrotation of intestine, HTN, referred by Dorothea Palumbo MD, PCP, for evaluation of urticaria and angioedema.    Patient states she has a longstanding H/O waxing and waning seasonal allergy symptoms since childhood.  Recently, she woke up at 3 am one day with random itching and hives all over her abdomen as well as associated lip edema.  She presented to the ED 4- where she improved on Benadryl and Solu-Medrol.  She was discharged on prednisone.  Patient denied any new medications, new foods, soaps or detergents or contact with anyone with similar Sx to account for her Sx.  She does endorse a sore throat the prior 2 days before noticing the rash.      Patient notes she went to a seafood restaurant the day before onset of the hives.  However, she has always consumed seafood in the past frequently with no issues.  She has switched to Free & Clear products and uses Xyzal PRN, but denies any Sx recurrence.  She does not typically take Motrin but does use Aleve with no problem.  Patient has never had formal allergy testing.  Dr. Pierce checks her vitamin D routinely and last checked Sep 2023 and normal.  She had COVID Oct 2023.    Patient does have Crohn's and questions the correlation with an autoimmune disorder from allergies.  She is S/P multiple bowel resections and has intermittent diarrhea, which she attributes to her shortened bowel.  She had an EGD last week.    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    Review of Systems   Gastrointestinal:  Positive for diarrhea (intermittent).     Objective   /86 (BP Location: Right arm, Patient Position: Sitting, BP Cuff Size: Adult)   Pulse 68   LMP 07/26/2024 (Approximate)   SpO2 98%       Physical Exam  Constitutional:       Appearance: Normal appearance.   HENT:      Head: Normocephalic and atraumatic.      Right Ear: External ear normal. There is no impacted cerumen.      Left Ear: External ear normal. There is no impacted cerumen.      Nose: No congestion or rhinorrhea.   Eyes:      Extraocular Movements: Extraocular movements intact.      Conjunctiva/sclera: Conjunctivae normal.      Pupils: Pupils are equal, round, and reactive to light.   Cardiovascular:      Rate and Rhythm: Normal rate and regular rhythm.      Heart sounds: No murmur heard.     No friction rub. No gallop.   Pulmonary:      Effort: No respiratory distress.      Breath sounds: No wheezing, rhonchi or rales.   Skin:     General: Skin is warm and dry.   Neurological:      Mental Status: She is alert.   Psychiatric:         Mood and Affect: Mood normal.         Behavior: Behavior normal.       Assessment/Plan   {Assess/PlanSmartLinks:74193}  No problem-specific Assessment & Plan notes found for this encounter.    By signing my name below, I, Jose Brock, attest that this documentation has been prepared under the direction and in the presence of Yulia Mortensen MD.  All medical record entries made by the Scribe were at my direction and personally dictated by me. I have reviewed the chart and agree that the record accurately reflects my personal performance of the history, physical exam, discussion and plan.

## 2024-08-12 ENCOUNTER — APPOINTMENT (OUTPATIENT)
Dept: ALLERGY | Facility: CLINIC | Age: 44
End: 2024-08-12
Payer: COMMERCIAL

## 2024-08-12 ENCOUNTER — CONSULT (OUTPATIENT)
Dept: ALLERGY | Facility: CLINIC | Age: 44
End: 2024-08-12
Payer: COMMERCIAL

## 2024-08-12 ENCOUNTER — PHARMACY VISIT (OUTPATIENT)
Dept: PHARMACY | Facility: CLINIC | Age: 44
End: 2024-08-12
Payer: COMMERCIAL

## 2024-08-12 ENCOUNTER — LAB (OUTPATIENT)
Dept: LAB | Facility: LAB | Age: 44
End: 2024-08-12
Payer: COMMERCIAL

## 2024-08-12 VITALS — OXYGEN SATURATION: 98 % | HEART RATE: 68 BPM | DIASTOLIC BLOOD PRESSURE: 86 MMHG | SYSTOLIC BLOOD PRESSURE: 148 MMHG

## 2024-08-12 DIAGNOSIS — D64.9 ANEMIA, UNSPECIFIED TYPE: ICD-10-CM

## 2024-08-12 DIAGNOSIS — L50.9 URTICARIA: ICD-10-CM

## 2024-08-12 DIAGNOSIS — D50.8 OTHER IRON DEFICIENCY ANEMIA: Primary | ICD-10-CM

## 2024-08-12 DIAGNOSIS — T78.3XXA ANGIOEDEMA WITH URTICARIA: Primary | ICD-10-CM

## 2024-08-12 PROCEDURE — 99204 OFFICE O/P NEW MOD 45 MIN: CPT | Performed by: ALLERGY & IMMUNOLOGY

## 2024-08-12 PROCEDURE — 86038 ANTINUCLEAR ANTIBODIES: CPT

## 2024-08-12 PROCEDURE — 83520 IMMUNOASSAY QUANT NOS NONAB: CPT

## 2024-08-12 PROCEDURE — 88185 FLOWCYTOMETRY/TC ADD-ON: CPT

## 2024-08-12 PROCEDURE — 82306 VITAMIN D 25 HYDROXY: CPT

## 2024-08-12 PROCEDURE — 85025 COMPLETE CBC W/AUTO DIFF WBC: CPT

## 2024-08-12 PROCEDURE — 80053 COMPREHEN METABOLIC PANEL: CPT

## 2024-08-12 PROCEDURE — 86800 THYROGLOBULIN ANTIBODY: CPT

## 2024-08-12 PROCEDURE — 85045 AUTOMATED RETICULOCYTE COUNT: CPT

## 2024-08-12 PROCEDURE — 86352 CELL FUNCTION ASSAY W/STIM: CPT

## 2024-08-12 PROCEDURE — 83550 IRON BINDING TEST: CPT

## 2024-08-12 PROCEDURE — 84443 ASSAY THYROID STIM HORMONE: CPT

## 2024-08-12 PROCEDURE — 36415 COLL VENOUS BLD VENIPUNCTURE: CPT

## 2024-08-12 PROCEDURE — 82728 ASSAY OF FERRITIN: CPT

## 2024-08-12 PROCEDURE — 86376 MICROSOMAL ANTIBODY EACH: CPT

## 2024-08-12 PROCEDURE — RXMED WILLOW AMBULATORY MEDICATION CHARGE

## 2024-08-12 PROCEDURE — 83540 ASSAY OF IRON: CPT

## 2024-08-12 PROCEDURE — 82785 ASSAY OF IGE: CPT

## 2024-08-12 RX ORDER — PREDNISONE 50 MG/1
50 TABLET ORAL ONCE AS NEEDED
Qty: 10 TABLET | Refills: 0 | Status: SHIPPED | OUTPATIENT
Start: 2024-08-12

## 2024-08-12 ASSESSMENT — ENCOUNTER SYMPTOMS: DIARRHEA: 1

## 2024-08-12 NOTE — PATIENT INSTRUCTIONS
At the onset of hives, take Xyzal one tablet twice a day.    If you have onset of swelling, take one 50 mg prednisone and 10 mg of Xyzal.    Complete blood work to evaluate causes of urticaria with angioedema.  I will follow up with you with results and further recommendations.

## 2024-08-13 PROBLEM — T78.3XXA ANGIOEDEMA WITH URTICARIA: Status: ACTIVE | Noted: 2024-08-13

## 2024-08-13 LAB
25(OH)D3 SERPL-MCNC: 48 NG/ML (ref 30–100)
ALBUMIN SERPL BCP-MCNC: 4 G/DL (ref 3.4–5)
ALP SERPL-CCNC: 45 U/L (ref 33–110)
ALT SERPL W P-5'-P-CCNC: 15 U/L (ref 7–45)
ANA SER QL HEP2 SUBST: NEGATIVE
ANION GAP SERPL CALC-SCNC: 14 MMOL/L (ref 10–20)
AST SERPL W P-5'-P-CCNC: 19 U/L (ref 9–39)
BASOPHILS # BLD AUTO: 0.04 X10*3/UL (ref 0–0.1)
BASOPHILS NFR BLD AUTO: 0.6 %
BILIRUB SERPL-MCNC: 0.6 MG/DL (ref 0–1.2)
BUN SERPL-MCNC: 17 MG/DL (ref 6–23)
CALCIUM SERPL-MCNC: 9.7 MG/DL (ref 8.6–10.6)
CHLORIDE SERPL-SCNC: 103 MMOL/L (ref 98–107)
CO2 SERPL-SCNC: 29 MMOL/L (ref 21–32)
CREAT SERPL-MCNC: 0.7 MG/DL (ref 0.5–1.05)
EGFRCR SERPLBLD CKD-EPI 2021: >90 ML/MIN/1.73M*2
EOSINOPHIL # BLD AUTO: 0.23 X10*3/UL (ref 0–0.7)
EOSINOPHIL NFR BLD AUTO: 3.6 %
ERYTHROCYTE [DISTWIDTH] IN BLOOD BY AUTOMATED COUNT: 16.4 % (ref 11.5–14.5)
FERRITIN SERPL-MCNC: 11 NG/ML (ref 8–150)
GLUCOSE SERPL-MCNC: 86 MG/DL (ref 74–99)
HCT VFR BLD AUTO: 36.1 % (ref 36–46)
HGB BLD-MCNC: 11.1 G/DL (ref 12–16)
HGB RETIC QN: 29 PG (ref 28–38)
IMM GRANULOCYTES # BLD AUTO: 0.01 X10*3/UL (ref 0–0.7)
IMM GRANULOCYTES NFR BLD AUTO: 0.2 % (ref 0–0.9)
IMMATURE RETIC FRACTION: 14.2 %
IRON SATN MFR SERPL: ABNORMAL %
IRON SERPL-MCNC: 42 UG/DL (ref 35–150)
LYMPHOCYTES # BLD AUTO: 2.28 X10*3/UL (ref 1.2–4.8)
LYMPHOCYTES NFR BLD AUTO: 35.7 %
MCH RBC QN AUTO: 26.1 PG (ref 26–34)
MCHC RBC AUTO-ENTMCNC: 30.7 G/DL (ref 32–36)
MCV RBC AUTO: 85 FL (ref 80–100)
MONOCYTES # BLD AUTO: 0.56 X10*3/UL (ref 0.1–1)
MONOCYTES NFR BLD AUTO: 8.8 %
NEUTROPHILS # BLD AUTO: 3.26 X10*3/UL (ref 1.2–7.7)
NEUTROPHILS NFR BLD AUTO: 51.1 %
NRBC BLD-RTO: 0 /100 WBCS (ref 0–0)
PLATELET # BLD AUTO: 248 X10*3/UL (ref 150–450)
POTASSIUM SERPL-SCNC: 3.9 MMOL/L (ref 3.5–5.3)
PROT SERPL-MCNC: 7.6 G/DL (ref 6.4–8.2)
RBC # BLD AUTO: 4.25 X10*6/UL (ref 4–5.2)
RETICS #: 0.09 X10*6/UL (ref 0.02–0.08)
RETICS/RBC NFR AUTO: 2.1 % (ref 0.5–2)
SODIUM SERPL-SCNC: 142 MMOL/L (ref 136–145)
THYROPEROXIDASE AB SERPL-ACNC: 35 IU/ML
TIBC SERPL-MCNC: ABNORMAL UG/DL
TOTAL IGE SMQN RAST: 13.4 KU/L
TSH SERPL-ACNC: 1 MIU/L (ref 0.44–3.98)
UIBC SERPL-MCNC: >450 UG/DL (ref 110–370)
WBC # BLD AUTO: 6.4 X10*3/UL (ref 4.4–11.3)

## 2024-08-13 ASSESSMENT — ENCOUNTER SYMPTOMS: DIARRHEA: 1

## 2024-08-13 NOTE — PROGRESS NOTES
Subjective   Patient ID:   [id number]   Samina Javier is a 43 y.o. female who presents for No chief complaint on file..    No chief complaint on file.       Expand All Collapse All          Subjective  Patient ID:   [id number]   Samina Javier is a 43 y.o. female who presents for Hives and Angioedema.       Chief Complaint  Patient presents with  · Hives  · Angioedema     This is a new patient, with a H/O GERD, Crohn's disease, congenital malrotation of intestine, HTN, referred by Dorothea Palumbo MD, PCP, for evaluation of urticaria and angioedema.     Patient states she has a longstanding H/O waxing and waning seasonal allergy symptoms since childhood.  Recently, she woke up at 3 am one day with random itching and hives all over her abdomen as well as associated lip edema.  She presented to the ED 4- where she improved on Benadryl and Solu-Medrol.  She was discharged on prednisone.  Patient denied any new medications, new foods, soaps or detergents or contact with anyone with similar Sx to account for her Sx.  She does endorse a sore throat the prior 2 days before noticing the rash.       Patient notes she went to a seafood restaurant the day before onset of the hives.  However, she has always consumed seafood in the past frequently with no issues.  She has switched to Free & Clear products and uses Xyzal PRN, but denies any Sx recurrence.  She does not typically take Motrin but does use Aleve with no problem.  Patient has never had formal allergy testing.  Dr. Pierce checks her vitamin D routinely and last checked Sep 2023 and normal.  She had COVID Oct 2023.     Patient does have Crohn's and questions the correlation with an autoimmune disorder from allergies.  She is S/P multiple bowel resections and has intermittent diarrhea, which she attributes to her shortened bowel.  She had an EGD last week.     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            Review of Systems    Gastrointestinal:  Positive for diarrhea.       Objective     LMP 07/26/2024 (Approximate)      Physical Exam  Constitutional:       Appearance: Normal appearance.   HENT:      Head: Normocephalic and atraumatic.      Right Ear: External ear normal. There is no impacted cerumen.      Left Ear: External ear normal. There is no impacted cerumen.      Nose: No congestion or rhinorrhea.   Eyes:      Extraocular Movements: Extraocular movements intact.      Conjunctiva/sclera: Conjunctivae normal.      Pupils: Pupils are equal, round, and reactive to light.   Cardiovascular:      Rate and Rhythm: Normal rate and regular rhythm.      Heart sounds: No murmur heard.     No friction rub. No gallop.   Pulmonary:      Effort: No respiratory distress.      Breath sounds: No wheezing, rhonchi or rales.   Skin:     General: Skin is warm and dry.   Neurological:      Mental Status: She is alert.   Psychiatric:         Mood and Affect: Mood normal.         Behavior: Behavior normal.       Assessment/Plan         Angioedema with urticaria  Patient has had a single episode. At this point I consider this to be an acute episode. If her symptoms return I would like her to take 10 mg of Xyzal and Prednisone 50 mg.     The differential diagnosis for chronic urticaria is vast and can include autoimmunity, allergy, malignancy, parasite infection, immunologic disorder, thyroid dysfunction, vitamin D deficiency, and hepatic or renal dysfunction. I am going to order a complete laboratory evaluation.           Yulia Mortensen MD

## 2024-08-13 NOTE — ASSESSMENT & PLAN NOTE
Patient has had a single episode. At this point I consider this to be an acute episode. If her symptoms return I would like her to take 10 mg of Xyzal and Prednisone 50 mg.     The differential diagnosis for chronic urticaria is vast and can include autoimmunity, allergy, malignancy, parasite infection, immunologic disorder, thyroid dysfunction, vitamin D deficiency, and hepatic or renal dysfunction. I am going to order a complete laboratory evaluation.

## 2024-08-14 LAB — THYROGLOB AB SERPL-ACNC: <0.9 IU/ML (ref 0–4)

## 2024-08-15 LAB — TRYPTASE SERPL-MCNC: 3.8 UG/L

## 2024-08-16 LAB
LABORATORY COMMENT REPORT: NORMAL
PATH REPORT.FINAL DX SPEC: NORMAL
PATH REPORT.GROSS SPEC: NORMAL
PATH REPORT.RELEVANT HX SPEC: NORMAL
PATH REPORT.TOTAL CANCER: NORMAL

## 2024-08-19 PROCEDURE — RXMED WILLOW AMBULATORY MEDICATION CHARGE

## 2024-08-20 ENCOUNTER — PHARMACY VISIT (OUTPATIENT)
Dept: PHARMACY | Facility: CLINIC | Age: 44
End: 2024-08-20
Payer: COMMERCIAL

## 2024-08-22 ENCOUNTER — PHARMACY VISIT (OUTPATIENT)
Dept: PHARMACY | Facility: CLINIC | Age: 44
End: 2024-08-22
Payer: COMMERCIAL

## 2024-08-22 LAB
CD203C (PERCENT OF BASOPHILS): 6.9 % (ref 0–12)
INTERPRETATION- ANTI-IGE RECEPTOR AB: NORMAL

## 2024-08-26 LAB — URTICARIA-INDUCING ACTIVITY: 1.1 INDEX UNIT

## 2024-08-27 ENCOUNTER — SPECIALTY PHARMACY (OUTPATIENT)
Dept: PHARMACY | Facility: CLINIC | Age: 44
End: 2024-08-27

## 2024-08-27 PROCEDURE — RXMED WILLOW AMBULATORY MEDICATION CHARGE

## 2024-08-28 ENCOUNTER — PHARMACY VISIT (OUTPATIENT)
Dept: PHARMACY | Facility: CLINIC | Age: 44
End: 2024-08-28
Payer: COMMERCIAL

## 2024-09-09 ENCOUNTER — APPOINTMENT (OUTPATIENT)
Dept: ALLERGY | Facility: CLINIC | Age: 44
End: 2024-09-09
Payer: COMMERCIAL

## 2024-09-12 ENCOUNTER — APPOINTMENT (OUTPATIENT)
Dept: ALLERGY | Facility: CLINIC | Age: 44
End: 2024-09-12
Payer: COMMERCIAL

## 2024-09-22 ENCOUNTER — SPECIALTY PHARMACY (OUTPATIENT)
Dept: PHARMACY | Facility: CLINIC | Age: 44
End: 2024-09-22

## 2024-09-22 PROCEDURE — RXMED WILLOW AMBULATORY MEDICATION CHARGE

## 2024-09-25 ENCOUNTER — PHARMACY VISIT (OUTPATIENT)
Dept: PHARMACY | Facility: CLINIC | Age: 44
End: 2024-09-25
Payer: COMMERCIAL

## 2024-10-14 PROCEDURE — RXMED WILLOW AMBULATORY MEDICATION CHARGE

## 2024-10-17 ENCOUNTER — PHARMACY VISIT (OUTPATIENT)
Dept: PHARMACY | Facility: CLINIC | Age: 44
End: 2024-10-17
Payer: COMMERCIAL

## 2024-10-21 ENCOUNTER — SPECIALTY PHARMACY (OUTPATIENT)
Dept: PHARMACY | Facility: CLINIC | Age: 44
End: 2024-10-21

## 2024-10-28 ENCOUNTER — SPECIALTY PHARMACY (OUTPATIENT)
Dept: PHARMACY | Facility: CLINIC | Age: 44
End: 2024-10-28

## 2024-10-28 DIAGNOSIS — K50.812 CROHN'S DISEASE OF BOTH SMALL AND LARGE INTESTINE WITH INTESTINAL OBSTRUCTION (MULTI): ICD-10-CM

## 2024-10-28 PROCEDURE — RXMED WILLOW AMBULATORY MEDICATION CHARGE

## 2024-10-28 RX ORDER — ADALIMUMAB 40MG/0.4ML
KIT SUBCUTANEOUS
Qty: 12 EACH | Refills: 3 | Status: SHIPPED | OUTPATIENT
Start: 2024-10-28 | End: 2025-10-27

## 2024-11-07 ENCOUNTER — PHARMACY VISIT (OUTPATIENT)
Dept: PHARMACY | Facility: CLINIC | Age: 44
End: 2024-11-07
Payer: COMMERCIAL

## 2024-11-20 NOTE — PROGRESS NOTES
Subjective   Patient ID: Samina Javier is a 44 y.o. female who presents for her annual physical       She admits there are times she was forgetting to take Protonix so she stopped   She takes all her other medications in the evening and sets an alarm to remember to take them     She is not following a restricted diet with her Crohn's disease.  She is careful about what she eats   She is not experiencing abdominal pain. Her BM cycle between normal and abnormal  She has no new Sx to report       HEALTH:  Pap more than 5 years ago, attempted 11/22 but failed, She saw Dr Xie in 2/23  Mammo 2016, 12/21, 12/22, 12/23, ordered 11/24   Diag mammo and US 12/23 confirm the area, 7/24   - Bx 1/12/2024 Lt breast: - Benign fibrous tissue with no significant pathological findings    -- FmHx of breast cancer, sister dx at age 45, paternal grandmother dx at age 50 paternal aunt dx at age 40.    BD never   Colon 10/20 single 2 mm erosion in izabella-TI. 1/23 normal and Q 3- 5  EKG 2019, 11/21, 10/23  Urine  Hep C ordered 11/24   Hep A completed x 2   Hep B vaccine completed x 3   Flu 9/21, 10/22, 11/23, 11/24 at work  TDAP she will check for date   Prevnar never  Pneumo never  Shingrix 3/2020 and 8/2020   Moderna CVD 12/21 and 1/21 booster 11/21, 5/22, 11/22, 10/23  Ophth Last seen summer 2023. No history of glaucoma or MD. She wears glasses        Review of Systems  All systems negative except those listed in the HPI      Past Medical, Surgical, and Family History reviewed and updated in chart.  Reviewed all medications by prescribing practitioner or clinical pharmacist   (such as prescriptions, OTCs, herbal therapies and supplements) and documented in the medical record       Objective   Visit Vitals  /60 (BP Location: Left arm, Patient Position: Sitting, BP Cuff Size: Adult)   Pulse 65   Temp 36.3 °C (97.4 °F) (Temporal)   Resp 14    Body mass index is 21.54 kg/m².     Physical Exam  Vitals reviewed.   Constitutional:        Appearance: Normal appearance. She is normal weight.   HENT:      Head: Normocephalic.      Right Ear: Tympanic membrane, ear canal and external ear normal.      Left Ear: Tympanic membrane, ear canal and external ear normal.      Nose: Nose normal.      Mouth/Throat:      Pharynx: Oropharynx is clear.   Eyes:      Conjunctiva/sclera: Conjunctivae normal.   Cardiovascular:      Rate and Rhythm: Normal rate and regular rhythm.      Pulses: Normal pulses.      Heart sounds: Normal heart sounds.   Pulmonary:      Effort: Pulmonary effort is normal.      Breath sounds: Normal breath sounds.   Chest:      Comments: Breast exam normal except right breast more nodular than left   Abdominal:      General: Bowel sounds are normal.      Palpations: Abdomen is soft.   Musculoskeletal:         General: Normal range of motion.      Cervical back: Normal range of motion and neck supple.   Skin:     General: Skin is warm.   Neurological:      General: No focal deficit present.      Mental Status: She is alert and oriented to person, place, and time.   Psychiatric:         Mood and Affect: Mood normal.         Behavior: Behavior normal.         Thought Content: Thought content normal.         Judgment: Judgment normal.       Assessment/Plan   Problem List Items Addressed This Visit       Angioedema with urticaria    Crohn's disease (Multi)    Gastroesophageal reflux disease    HTN (hypertension)    Iron deficiency anemia    Vitamin D deficiency     Other Visit Diagnoses       Healthcare maintenance    -  Primary    Relevant Orders    Lipid Panel    Encounter for hepatitis C screening test for low risk patient        Relevant Orders    Hepatitis C Antibody            Annual physical completed   Reviewed her labs from 8/24   Annual labs ordered     Health Maintenence completed  -  Discussed healthy diet and regular exercise.    -  Physical exam overall unremarkable. Immunizations reviewed and updated accordingly. Healthy lifestyle  choices discussed (tobacco avoidance, appropriate alcohol use, avoidance of illicit substances).   -  Patient is wearing seatbelt.   -  Screening lab work ordered as indicated.    -  Age appropriate screening tests reviewed with patient.       She is single with no children. 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   Her mother started BP medications in her 70's   Her dad has CAD w/ stent.   Her sister is on amlodipine as well and had pericarditis in 2021.   Continue amlodipine 10 mg daily and hydrochlorothiazide 12.5 mg daily   EKG 10/2023 was sinus rhythm   Renal artery US 11/2021 normal   ECHO 11/2021 was normal   Recommend she monitor her BP at home and call with elevated readings      I have spent 15 min face to face with this patient discussing their cardiac risk   We discussed the patients cardiovascular risk. If needed, lifestyle modifications recommended including: behavioral therapies of nutrition choices, exercise and eliminate habits that are contributing to their cardiac risk. We agreed to a plan to decrease his cardiovascular risks. Discussed ASA. Reviewed Guidelines and approved recommendations made to patient.   The patient's 10 yr CV risk was estimated at  0.5 %  11/2024     Urticaria and facial edema :    Seen in the ED 4/20/2024. She had a sore throat the prior 2 days. States that she noticed a rash on her trunk. She then woke up with lip swelling that prompted her evaluation in the ED. Clinical picture concerning for allergic reaction of unclear trigger. Symptomatic improvement with Benadryl and Solu-Medrol in the ED  The day before she did go to a seafood restaurant but she  eats seafood all the time    Her lips were so edematous it looked like she had fillers done   She saw Dr Mortensen in 8/24 Patient has had a single episode. At this point I consider this to be an acute episode. If her symptoms return I would like her to take 10 mg of Xyzal and Prednisone 50 mg.       Oropharyngeal dysphagia:   She admits there are times she was forgetting to take Protonix so she stopped   EGD  8/24 Few medium and large diverticula in the 2nd part of the duodenum, otherwise normal       Crohn disease: She is not following a restricted diet with her Crohn's disease. She is not experiencing abdominal pain. Her BM cycle between normal and abnormal. She has no new Sx to report. Discussed avoiding night shade plants. She can try this and see if it helps.   She has struggled with this since age 14.    Surgical History: History of colectomy, ileocecal resection, ileostomy closure, Juanpablo procedure, right hemicolectomy and small bowel resection   - S/p laparotomy, colectomy on 11/26/19 for malrotation of the bowel and Phlegmon of the abdominal wall. Extensive lysis of adhesions interloop, abdominal wall and retroperitoneal, hepatic, drainage of intra- abdominal abscess, small bowel resection and stricturoplasty with repair of enterotomies on 12/9/2019 with Dr Reeves   She has hyoscyamine to use prn abdominal pain and cramping   Continue Humira 40 mg SQ Q 14 days and loperamide 1- 2 tablets preprandial QID   Stopped Remicade in 10/2018  MRI Enterography 6/2022 showed post surgical changes c/w Juanpablo's procedure and ileocolonic resection with primary anastomosis. No evidence of thickening or enhancement to suggest an acute Crohn's flare. Mild central intrahepatic biliary dilation with slight irregularity of the dilated duct predominantly in the left lobe without definite stricture. Cholelithiasis with gallstone in the neck of the gallbladder with abutting the common hepatic duct. Subseptate uterus with a fibroid.  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 She is unsure why it will not heal completely   Recommend and orders placed for dermatology for further evaluation 11/2023     Vitamin D def:  Discontinue scripted Vitamin D due to concerns for bone loss with higher doses  Recommend taking OTC Vitamin D3 2000 UT daily   Continue OTC Vitamin B 12 SL 1000 UT daily      Pap more than 5 years ago. She saw Dr Xie in 2/2023.  Pap attempted but failed due to tight pelvic structures > right 11/2022.    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 There is extensive abdominal scar tissue that limits exam. The endometrium is suboptimally visualized and appears to be prominent. The right ovary is subvisualized.  The left ovary is not visualized. The bladder is not well distended. Transvaginal could not be done due to patient's inability to tolerate.   Recommend hysteroscopy and D&C 2/2023. She was supposed to have the hysteroscopy and D&C in 5/2023 but there was issues with the schedules.       Mammo normal in 7/24  Family history of breast cancer, sister diagnosed at age 45, paternal grandmother diagnosed at age 50 paternal aunt diagnosed at age 40.     She had Bx 1/12/2024 Left breast, 07:00, 7 cm from nipple, biopsy:    -- Benign fibrous breast tissue with no significant pathological findings    She saw Dr Casillas 7/2024 and has fibrocystic breast changes bilaterally. Return to yearly screenings and PCP can do breast exams    Breast exam normal except right breast more nodular than left 11/24   Mammo bilateral ordered 11/24     BD never   Colonoscopy 1/2023 normal and Q 3- 5      Ophth:  Last seen summer 2023. No history of glaucoma or MD. She wears glasses      Hep C ordered 11/24   Hep A completed x 2   Hep B vaccine completed x 3   Flu 9/21, 10/22, 11/23, 11/24 at work   TDAP she will check for date   Prevnar never  Pneumo never  Shingrix 3/2020 and 8/2020   Moderna CVD 12/21 and 1/21 booster 11/21, 5/22, 11/22, 10/23      Some elements in the chart were copied from Dr. Palumbo's last office visit with patient.   Notes have been updated where appropriate, and reflect my current medical decision making from today.       Return in 1 year for CPE or sooner if needed   (CPE due 11/2025)      Scribe Attestation  By signing my name below, I, Kathy Shepardrd , Scribe   attest that this documentation has been prepared under the direction and in the presence of Dorothea Palumbo MD.

## 2024-11-21 ENCOUNTER — APPOINTMENT (OUTPATIENT)
Dept: PRIMARY CARE | Facility: CLINIC | Age: 44
End: 2024-11-21
Payer: COMMERCIAL

## 2024-11-21 VITALS
WEIGHT: 114 LBS | BODY MASS INDEX: 21.52 KG/M2 | RESPIRATION RATE: 14 BRPM | HEART RATE: 65 BPM | DIASTOLIC BLOOD PRESSURE: 60 MMHG | SYSTOLIC BLOOD PRESSURE: 114 MMHG | TEMPERATURE: 97.4 F | HEIGHT: 61 IN | OXYGEN SATURATION: 98 %

## 2024-11-21 DIAGNOSIS — K50.10 CROHN'S DISEASE OF LARGE INTESTINE WITHOUT COMPLICATION (MULTI): ICD-10-CM

## 2024-11-21 DIAGNOSIS — D50.9 IRON DEFICIENCY ANEMIA, UNSPECIFIED IRON DEFICIENCY ANEMIA TYPE: ICD-10-CM

## 2024-11-21 DIAGNOSIS — Z11.59 ENCOUNTER FOR HEPATITIS C SCREENING TEST FOR LOW RISK PATIENT: ICD-10-CM

## 2024-11-21 DIAGNOSIS — K21.9 GASTROESOPHAGEAL REFLUX DISEASE WITHOUT ESOPHAGITIS: ICD-10-CM

## 2024-11-21 DIAGNOSIS — I10 PRIMARY HYPERTENSION: ICD-10-CM

## 2024-11-21 DIAGNOSIS — T78.3XXA ANGIOEDEMA WITH URTICARIA: ICD-10-CM

## 2024-11-21 DIAGNOSIS — Z00.00 HEALTHCARE MAINTENANCE: Primary | ICD-10-CM

## 2024-11-21 DIAGNOSIS — Z12.31 VISIT FOR SCREENING MAMMOGRAM: ICD-10-CM

## 2024-11-21 DIAGNOSIS — E55.9 VITAMIN D DEFICIENCY: ICD-10-CM

## 2024-11-21 PROCEDURE — 3074F SYST BP LT 130 MM HG: CPT | Performed by: INTERNAL MEDICINE

## 2024-11-21 PROCEDURE — 99396 PREV VISIT EST AGE 40-64: CPT | Performed by: INTERNAL MEDICINE

## 2024-11-21 PROCEDURE — 1036F TOBACCO NON-USER: CPT | Performed by: INTERNAL MEDICINE

## 2024-11-21 PROCEDURE — 3078F DIAST BP <80 MM HG: CPT | Performed by: INTERNAL MEDICINE

## 2024-11-21 PROCEDURE — 3008F BODY MASS INDEX DOCD: CPT | Performed by: INTERNAL MEDICINE

## 2024-11-21 ASSESSMENT — PROMIS GLOBAL HEALTH SCALE
RATE_SOCIAL_SATISFACTION: FAIR
RATE_PHYSICAL_HEALTH: GOOD
RATE_AVERAGE_PAIN: 1
RATE_MENTAL_HEALTH: GOOD
EMOTIONAL_PROBLEMS: SOMETIMES
RATE_QUALITY_OF_LIFE: GOOD
CARRYOUT_SOCIAL_ACTIVITIES: GOOD
RATE_GENERAL_HEALTH: GOOD
CARRYOUT_PHYSICAL_ACTIVITIES: COMPLETELY
RATE_AVERAGE_FATIGUE: MODERATE

## 2024-11-21 ASSESSMENT — PATIENT HEALTH QUESTIONNAIRE - PHQ9
SUM OF ALL RESPONSES TO PHQ9 QUESTIONS 1 AND 2: 0
1. LITTLE INTEREST OR PLEASURE IN DOING THINGS: NOT AT ALL
2. FEELING DOWN, DEPRESSED OR HOPELESS: NOT AT ALL

## 2024-11-29 ENCOUNTER — SPECIALTY PHARMACY (OUTPATIENT)
Dept: PHARMACY | Facility: CLINIC | Age: 44
End: 2024-11-29

## 2024-11-29 PROCEDURE — RXMED WILLOW AMBULATORY MEDICATION CHARGE

## 2024-12-04 ENCOUNTER — PHARMACY VISIT (OUTPATIENT)
Dept: PHARMACY | Facility: CLINIC | Age: 44
End: 2024-12-04
Payer: COMMERCIAL

## 2024-12-12 ENCOUNTER — PHARMACY VISIT (OUTPATIENT)
Dept: PHARMACY | Facility: CLINIC | Age: 44
End: 2024-12-12
Payer: COMMERCIAL

## 2024-12-12 DIAGNOSIS — I10 PRIMARY HYPERTENSION: ICD-10-CM

## 2024-12-12 DIAGNOSIS — K50.10 CROHN'S DISEASE OF LARGE INTESTINE WITHOUT COMPLICATION (MULTI): Primary | ICD-10-CM

## 2024-12-12 PROCEDURE — RXMED WILLOW AMBULATORY MEDICATION CHARGE

## 2024-12-12 RX ORDER — ADALIMUMAB-ADAZ 40 MG/.4ML
40 INJECTION, SOLUTION SUBCUTANEOUS
Qty: 0.8 ML | Refills: 5 | Status: SHIPPED | OUTPATIENT
Start: 2024-12-12

## 2024-12-12 RX ORDER — HYDROCHLOROTHIAZIDE 12.5 MG/1
12.5 TABLET ORAL DAILY
Qty: 90 TABLET | Refills: 3 | Status: SHIPPED | OUTPATIENT
Start: 2024-12-12 | End: 2025-12-12

## 2024-12-17 ENCOUNTER — HOSPITAL ENCOUNTER (OUTPATIENT)
Dept: RADIOLOGY | Facility: CLINIC | Age: 44
Discharge: HOME | End: 2024-12-17
Payer: COMMERCIAL

## 2024-12-17 VITALS — HEIGHT: 61 IN | BODY MASS INDEX: 21.52 KG/M2 | WEIGHT: 114 LBS

## 2024-12-17 DIAGNOSIS — Z12.31 VISIT FOR SCREENING MAMMOGRAM: ICD-10-CM

## 2024-12-17 PROCEDURE — 77067 SCR MAMMO BI INCL CAD: CPT

## 2024-12-17 PROCEDURE — 77067 SCR MAMMO BI INCL CAD: CPT | Performed by: RADIOLOGY

## 2024-12-17 PROCEDURE — 77063 BREAST TOMOSYNTHESIS BI: CPT | Performed by: RADIOLOGY

## 2024-12-20 ENCOUNTER — SPECIALTY PHARMACY (OUTPATIENT)
Dept: PHARMACY | Facility: CLINIC | Age: 44
End: 2024-12-20

## 2025-01-05 ENCOUNTER — SPECIALTY PHARMACY (OUTPATIENT)
Dept: PHARMACY | Facility: CLINIC | Age: 45
End: 2025-01-05

## 2025-01-07 ENCOUNTER — APPOINTMENT (OUTPATIENT)
Dept: INFECTIOUS DISEASES | Facility: CLINIC | Age: 45
End: 2025-01-07
Payer: COMMERCIAL

## 2025-01-07 DIAGNOSIS — Z71.84 COUNSELING FOR TRAVEL: Primary | ICD-10-CM

## 2025-01-07 PROCEDURE — 90738 INACTIVATED JE VACC IM: CPT | Performed by: STUDENT IN AN ORGANIZED HEALTH CARE EDUCATION/TRAINING PROGRAM

## 2025-01-07 PROCEDURE — RXMED WILLOW AMBULATORY MEDICATION CHARGE

## 2025-01-07 PROCEDURE — 90715 TDAP VACCINE 7 YRS/> IM: CPT | Performed by: STUDENT IN AN ORGANIZED HEALTH CARE EDUCATION/TRAINING PROGRAM

## 2025-01-07 PROCEDURE — 90471 IMMUNIZATION ADMIN: CPT | Performed by: STUDENT IN AN ORGANIZED HEALTH CARE EDUCATION/TRAINING PROGRAM

## 2025-01-07 PROCEDURE — 90471U01 JAPANESE ENCEPHALITIS VACCINE IM: Performed by: STUDENT IN AN ORGANIZED HEALTH CARE EDUCATION/TRAINING PROGRAM

## 2025-01-07 PROCEDURE — 99202U03 TRAVEL CONSULT (U03): Performed by: STUDENT IN AN ORGANIZED HEALTH CARE EDUCATION/TRAINING PROGRAM

## 2025-01-07 RX ORDER — AZITHROMYCIN 500 MG/1
TABLET, FILM COATED ORAL
Qty: 4 TABLET | Refills: 0 | Status: SHIPPED | OUTPATIENT
Start: 2025-01-07

## 2025-01-07 RX ORDER — ATOVAQUONE AND PROGUANIL HYDROCHLORIDE 250; 100 MG/1; MG/1
1 TABLET, FILM COATED ORAL DAILY
Qty: 15 TABLET | Refills: 0 | Status: SHIPPED | OUTPATIENT
Start: 2025-01-07 | End: 2025-01-25

## 2025-01-07 NOTE — PROGRESS NOTES
Traveling to [facility] and [facility] from 2/09-2/21/25.  Will be in [facility] for 6 days and will be in areas with malaria transmission in [facility]. Pt is on Humira and is immuno-compromised.  Has HepA vaccines, Typhoid ViCPs 7/2023, and JE (one dose) 8/2023.    Ordered Tdap and JE vaccines.    Prescribed Malarone (15 doses) and azithromycin for Traveler's diarrhea.    Discussed food, water, and insect precautions.

## 2025-01-07 NOTE — PATIENT INSTRUCTIONS
You were seen today for your upcoming trip.    For your country specific issues, please refer back to the [employer] handouts supplied to you in the travel brochure folder that we provided to you at your visit.  These are updated daily, if necessary, by the reporting agencies, so are a valuable source of information for your trip.    This brief summary may not contain all of the items that we discussed today.  Please review the handouts given to you as well.    ** Please be sure to carry any medications with you in your carry-on luggage in the event that your luggage is delayed or lost during your travels.    ** For your trip, as we discussed, standard food and water precautions apply.     You should avoid drinking any water that is not bottled.  Alcoholic or carbonated beverages are safe to drink.  Any beverage that has been brought to a rolling boil for  3 minutes is also safe to drink (once it has cooled some).  Commercially purchased milk products that are boxed (may be on store shelves) or refrigerated, are pasteurized and therefore safe to drink as long as they are refrigerated after opening.  Juices that are prepared commercially (in boxes or individual bottles) are also safe to drink as they are pasteurized as well.  Avoid road-side juice vendors as the juice may be diluted with contaminated water or produced from unclean fruit.  Regarding food precautions, you want to make sure that meats are well cooked.   Avoid eating fresh fruits and vegetables raw with the skin intact.  Peel before eating.  Avoid salads due to possible contamination by contaminated water.  Avoid any unpasteurized cheeses (they typically are very white in appearance)    ** We recommend that you use insect repellant to help you prevent infections caused by biting insects such as mosquitoes, flies, ticks, fleas and chiggers.  This includes protection against Zika virus, Dengue virus, Chikungunya and Malaria, just to name a few.    For insect  "repellents that are applied directly to the skin, we recommend DEET containing repellants with a percentage between 20-40%.  Apply to skin exposed surfaces only, every 6-8 hours throughout the day.  I typically recommend applying before breakfast, lunch and dinner as these are natural breaks in your day.  Wash your hands after applying. Apply again in the evening.  You must reapply after bathing or swimming as it is washed away with water.  Apply after sunscreen products are applied. DEET containing repellants protect against all concerning insects with the exception of hornets, wasps or bees. Activity against ticks only lasts for 2 hours. For additional Tick precautions while hiking, tuck your pant legs into your socks as this will prevent ticks from crawling up your shoes / socks onto your legs while walking. Perform a \"tick check\" at least once daily, at the end of your day.  Check in the sporting goods areas of most stores for these products.  A recommended alternative, Picardin ,may also be used every 8 hours.  If you are unable to locate the product in stores, try on-line stores as well.      PERMETHRIN SPRAY  is an additional option and is for application to clothing and garments only.  This can be applied to hats, bandanas, socks, boots and any clothing items to prevent insect attention.  I can be applied before you leave and will remain active through many washes and for up to 6 weeks in unwashed clothing.  Read any packaging for full specifications. Apply in an open area as when wet, it has an odor. Once dry, it typically has no odor and does not stain clothing. Regular repellants should be applied to exposed skin however.  Permethrin may only be available on-line.     **  As a general safety procedure, we recommend that you scan a copy of your passport, any travel required documents (yellow fever vaccine card), and itinerary and email them to yourself.  Keep these in a special travel folder for reference " in the event that your travel documents are misplaced or stolen while abroad.  You should also leave a detailed copy of your itinerary with a friend or relative at home for reference as well.  If traveling for long periods, an international [employer] card or global plan for your cellular service may be beneficial for communication. This list is not meant to be all inclusive.      **  Please review and understand any cultural aspects of the countries that you will be visiting to ensure that appropriate dress and behaviors are understood.  ENJOY YOUR TRIP (o:      **  Make sure to come back to complete any vaccine series that may have been started today.  This will ensure full vaccine efficacy and protection for future travel.    Today, you received the Japanese encephalitis vaccine.  You will want to get the 2nd dose in 7-28 days. You also received the Tdap vaccine, which is good for 10 years. I sent prescriptions for Malarone for malaria prevention and for azithromycin for Traveler's diarrhea to your pharmacy.  Have a great trip!

## 2025-01-08 ENCOUNTER — SPECIALTY PHARMACY (OUTPATIENT)
Dept: PHARMACY | Facility: CLINIC | Age: 45
End: 2025-01-08

## 2025-01-10 ENCOUNTER — PHARMACY VISIT (OUTPATIENT)
Dept: PHARMACY | Facility: CLINIC | Age: 45
End: 2025-01-10
Payer: COMMERCIAL

## 2025-01-15 ENCOUNTER — SPECIALTY PHARMACY (OUTPATIENT)
Dept: PHARMACY | Facility: CLINIC | Age: 45
End: 2025-01-15

## 2025-01-15 PROCEDURE — RXMED WILLOW AMBULATORY MEDICATION CHARGE

## 2025-01-16 ENCOUNTER — PHARMACY VISIT (OUTPATIENT)
Dept: PHARMACY | Facility: CLINIC | Age: 45
End: 2025-01-16
Payer: COMMERCIAL

## 2025-01-17 ENCOUNTER — SPECIALTY PHARMACY (OUTPATIENT)
Dept: PHARMACY | Facility: CLINIC | Age: 45
End: 2025-01-17

## 2025-01-17 NOTE — PROGRESS NOTES
[facility] PATIENT NOTE    The patient was educated that they are receiving a biosimilar product Hyrimoz generic which is highly similar to the referenced product Humira, as indicated for their Crohn's disease. The patient was educated that a biosimilar product is highly similar to and has no clinically meaningful differences in terms of safety or efficacy from the existing FDA approved reference product. The patient was directed to the FDA information page on biosimilars at https://www.fda.gov/drugs/biosimilars/biosimilar-basics-patients.   Abrilada™ https://www.accessdata.fda.gov/drugsatfda_docs/label/2023/219792m964mwx.pdf#page=46   Amjevita™ https://www.EpiVax/-/media/Project/Amgen/Repository/Loot!-Tyba-DPSI/Amjevita/amjevita_pi_hcp_english.pdf   Cyltezo® https://www.accessdata.fda.gov/drugsatfda_docs/label/2023/879463t087gkj.pdf#page=38   Hadlima™ https://www.accessdata.fda.gov/drugsatfda_docs/label/2023/953909d200neg.pdf#page=46   Hulio® https://www.accessdata.fda.gov/drugsatfda_docs/label/2020/272603j241aob.pdf   Hyrimoz® https://www.accessdata.fda.gov/drugsatfda_docs/label/2023/733560p681ndx.pdf#page=45   Idacio® https://www.accessdata.fda.gov/drugsatfda_docs/label/2022/870890z687led.pdf   Simlandi® https://www.accessdata.fda.gov/drugsatfda_docs/label/2024/838223m204nkd.pdf   Yuflyma® https://www.accessdata.fda.gov/drugsatfda_docs/label/2023/105842r170mux.pdf#page=39   Yusimry™ https://www.accessdata.fda.gov/drugsatfda_docs/label/2021/454191r301wus.pdf   Administration technique:    The patient was instructed to contact the [facility] with follow-up questions or concern and verbalized understanding.

## 2025-01-21 ENCOUNTER — APPOINTMENT (OUTPATIENT)
Dept: INFECTIOUS DISEASES | Facility: CLINIC | Age: 45
End: 2025-01-21
Payer: COMMERCIAL

## 2025-01-21 DIAGNOSIS — Z23 NEED FOR VACCINATION: ICD-10-CM

## 2025-01-21 PROCEDURE — 90738 INACTIVATED JE VACC IM: CPT | Performed by: STUDENT IN AN ORGANIZED HEALTH CARE EDUCATION/TRAINING PROGRAM

## 2025-01-21 PROCEDURE — 90471U01 JAPANESE ENCEPHALITIS VACCINE IM: Performed by: STUDENT IN AN ORGANIZED HEALTH CARE EDUCATION/TRAINING PROGRAM

## 2025-02-11 PROCEDURE — RXMED WILLOW AMBULATORY MEDICATION CHARGE

## 2025-02-20 DIAGNOSIS — Z71.84 COUNSELING FOR TRAVEL: Primary | ICD-10-CM

## 2025-02-20 RX ORDER — AZITHROMYCIN 500 MG/1
TABLET, FILM COATED ORAL
Qty: 4 TABLET | Refills: 0 | Status: SHIPPED | OUTPATIENT
Start: 2025-02-20

## 2025-02-20 NOTE — PROGRESS NOTES
Pt contacted me through email. Was seen in the travel clinic and is now on her trip to [facility], [place] and [facility]. Has developed traveler's diarrhea. Is taking the azithromycin but diarrhea has not stopped yet. Asking for more azithromycin that she can take upon her return on Friday 2/21. Sent prescription for azithromycin for Traveler's diarrhea.

## 2025-02-24 ENCOUNTER — OFFICE VISIT (OUTPATIENT)
Dept: PRIMARY CARE | Facility: CLINIC | Age: 45
End: 2025-02-24
Payer: COMMERCIAL

## 2025-02-24 ENCOUNTER — SPECIALTY PHARMACY (OUTPATIENT)
Dept: PHARMACY | Facility: CLINIC | Age: 45
End: 2025-02-24

## 2025-02-24 ENCOUNTER — PHARMACY VISIT (OUTPATIENT)
Dept: PHARMACY | Facility: CLINIC | Age: 45
End: 2025-02-24
Payer: COMMERCIAL

## 2025-02-24 VITALS
WEIGHT: 117 LBS | TEMPERATURE: 97.6 F | OXYGEN SATURATION: 98 % | BODY MASS INDEX: 22.09 KG/M2 | SYSTOLIC BLOOD PRESSURE: 122 MMHG | HEART RATE: 62 BPM | HEIGHT: 61 IN | DIASTOLIC BLOOD PRESSURE: 70 MMHG

## 2025-02-24 DIAGNOSIS — I10 PRIMARY HYPERTENSION: ICD-10-CM

## 2025-02-24 DIAGNOSIS — H65.01 NON-RECURRENT ACUTE SEROUS OTITIS MEDIA OF RIGHT EAR: Primary | ICD-10-CM

## 2025-02-24 DIAGNOSIS — H61.21 HEARING LOSS DUE TO CERUMEN IMPACTION, RIGHT: ICD-10-CM

## 2025-02-24 DIAGNOSIS — K50.919 CROHN'S DISEASE WITH COMPLICATION, UNSPECIFIED GASTROINTESTINAL TRACT LOCATION: ICD-10-CM

## 2025-02-24 PROCEDURE — 1036F TOBACCO NON-USER: CPT | Performed by: INTERNAL MEDICINE

## 2025-02-24 PROCEDURE — 3008F BODY MASS INDEX DOCD: CPT | Performed by: INTERNAL MEDICINE

## 2025-02-24 PROCEDURE — 99214 OFFICE O/P EST MOD 30 MIN: CPT | Performed by: INTERNAL MEDICINE

## 2025-02-24 PROCEDURE — 3078F DIAST BP <80 MM HG: CPT | Performed by: INTERNAL MEDICINE

## 2025-02-24 PROCEDURE — RXMED WILLOW AMBULATORY MEDICATION CHARGE

## 2025-02-24 PROCEDURE — 3074F SYST BP LT 130 MM HG: CPT | Performed by: INTERNAL MEDICINE

## 2025-02-24 RX ORDER — FLUTICASONE PROPIONATE 50 MCG
2 SPRAY, SUSPENSION (ML) NASAL DAILY
Qty: 16 G | Refills: 5 | Status: SHIPPED | OUTPATIENT
Start: 2025-02-24 | End: 2026-02-24

## 2025-02-24 NOTE — PROGRESS NOTES
"Subjective   Patient ID: Samina Javier is a 44 y.o. female who presents for evaluation for UR Sx         She has fullness in the ears and the right ear is muffled.  She had a mild ST yesterday 2/23/25, no cough or drainage.  She has been on azithromycin for travelers diarrhea   She completed 3 days of the azithromycin but she restarted taking azithromycin again 2/22/25  She will take her last dose of azithromycin today 2/24/25  She admits she usually gets \"ear popping\" when she flies.    Her diarrhea was watery when she first had Sx       HEALTH:  Pap more than 5 years ago, attempted 11/22 but failed, She saw Dr Xie in 2/23  Mammo 2016, 12/21, 12/22, 12/23, 12/24   Diag mammo and US 12/23 confirm the area, 7/24   - Bx 1/12/2024 Lt breast: - Benign fibrous tissue with no significant pathological findings    -- FmHx of breast cancer, sister dx at age 45, paternal grandmother dx at age 50 paternal aunt dx at age 40.    BD never   Colon 10/20 single 2 mm erosion in izabella-TI. 1/23 normal and Q 3- 5  EKG 2019, 11/21, 10/23  Urine  Hep C ordered 11/24   Hep A completed x 2   Hep B vaccine completed x 3   Flu 9/21, 10/22, 11/23, 11/24 at work  TDAP she will check for date   Prevnar never  Pneumo never  Shingrix 3/2020 and 8/2020   SARS-CoV-2- 12/21, 1/21, 11/21, 5/22, 11/22, 10/23, 11/25   Ophth Last seen summer 2023. No history of glaucoma or MD. She wears glasses        Review of Systems  All systems negative except those listed in the HPI       Objective   Visit Vitals  /70 (BP Location: Left arm, Patient Position: Sitting, BP Cuff Size: Adult)   Pulse 62   Temp 36.4 °C (97.6 °F) (Temporal)    Body mass index is 22.11 kg/m².     Physical Exam  Vitals reviewed.   Constitutional:       Appearance: Normal appearance. She is normal weight.   HENT:      Head: Normocephalic.      Right Ear: Ear canal and external ear normal.      Left Ear: Tympanic membrane, ear canal and external ear normal.      Ears:      Comments: " cerumen impaction right ear      Nose: Nose normal.      Mouth/Throat:      Pharynx: Oropharynx is clear.   Eyes:      Conjunctiva/sclera: Conjunctivae normal.   Cardiovascular:      Rate and Rhythm: Normal rate and regular rhythm.      Pulses: Normal pulses.      Heart sounds: Normal heart sounds.   Pulmonary:      Effort: Pulmonary effort is normal.      Breath sounds: Normal breath sounds.   Abdominal:      General: Bowel sounds are normal.      Palpations: Abdomen is soft.   Musculoskeletal:      Cervical back: Normal range of motion and neck supple.   Skin:     General: Skin is warm.   Neurological:      General: No focal deficit present.      Mental Status: She is alert and oriented to person, place, and time.   Psychiatric:         Mood and Affect: Mood normal.         Behavior: Behavior normal.         Thought Content: Thought content normal.         Judgment: Judgment normal.       Patient ID: Samina Javier is a 44 y.o. female.    Ear Cerumen Removal    Date/Time: 2/24/2025 1:57 PM    Performed by: Dorothea Palumbo MD  Authorized by: Dorothea Palumbo MD    Consent:     Consent obtained:  Verbal    Consent given by:  Patient    Risks discussed:  Bleeding, dizziness, incomplete removal, infection, pain and TM perforation  Universal protocol:     Patient identity confirmed:  Verbally with patient  Procedure details:     Location:  R ear    Procedure type comment:  Curette and irrigation    Procedure outcomes: cerumen removed    Post-procedure details:     Post-procedure ear inspection: erythema and effusion right ear.    Procedure completion:  Tolerated well, no immediate complications      Assessment/Plan   Problem List Items Addressed This Visit    None  Visit Diagnoses       Non-recurrent acute serous otitis media of right ear    -  Primary            Follow up completed      She is single with no children. 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 See procedure note above 2/25. She is on her second round of azithromycin so we will hold off on Abx for now   She has fullness in the ears and the right ear is muffled.  She had a mild ST yesterday 2/23/25, no cough or drainage.  She has been on azithromycin for travelers diarrhea   She completed 3 days of the azithromycin but she restarted taking azithromycin again 2/22/25. She will take her last dose of azithromycin today 2/24/25  She admits she usually gets \"ear popping\" when she flies.   Her diarrhea was watery when she first had Sx    Recommend OTC Pepto Bismol according to directions on packaging to help with diarrhea.   Continue Xyzal 10 mg nightly  Recommend beginning OTC Flonase NS BID for 3 days then QD until Sx resolve. Refilled Flonase NS 2/25  Recommend opening the ET throughout the day  She will call if Sx persist or worsen       She saw Dr Connors in ENT    She has no issues with her hearing to report       Her weight/ BMI is in normal range in office, recommend she maintain   Her weight is 117 pounds with BMI at 22.11  IO 2/25  She walks her dog 3- 4 times a week      HTN, onset 11/2021:  BP stable.   Continue amlodipine 10 mg daily and hydrochlorothiazide 12.5 mg daily    Her mother started BP medications in her 70's   Her dad has CAD w/ stent.   Her sister is on amlodipine as well and had pericarditis in 2021.   EKG 10/2023 was sinus rhythm   Renal artery US 11/2021 normal   ECHO 11/2021 was normal   Recommend she monitor her BP at home and call with elevated readings      I have spent 15 min face to face with this patient discussing their cardiac risk   We discussed the patients cardiovascular risk. If needed, lifestyle modifications recommended including: behavioral therapies of nutrition choices, exercise and eliminate habits that are contributing to their cardiac " risk. We agreed to a plan to decrease his cardiovascular risks. Discussed ASA. Reviewed Guidelines and approved recommendations made to patient.   The patient's 10 yr CV risk was estimated at  0.6 %  IO 2/25      Urticaria and facial edema :    Seen in the ED 4/20/2024. She had a sore throat the prior 2 days.   States that she noticed a rash on her trunk. She then woke up with lip swelling that prompted her evaluation in the ED. Clinical picture concerning for allergic reaction of unclear trigger. Symptomatic improvement with Benadryl and Solu-Medrol in the ED  The day before she did go to a seafood restaurant but she eats seafood all the time    Her lips were so edematous it looked like she had fillers done   She saw Dr Mortensen in 8/24 Patient has had a single episode. At this point I consider this to be an acute episode. If her symptoms return I would like her to take 10 mg of Xyzal and Prednisone 50 mg.        Oropharyngeal dysphagia:   She admits there are times she was forgetting to take Protonix so she stopped   EGD  8/24 Few medium and large diverticula in the 2nd part of the duodenum, otherwise normal       Crohn disease:   Continue Humira 40 mg SQ Q 14 days and loperamide 1- 2 tablets preprandial QID  She has hyoscyamine to use prn abdominal pain and cramping     Stopped Remicade in 10/2018   She has struggled with this since age 14.    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 Extensive lysis of adhesions interloop, abdominal wall and retroperitoneal, hepatic, drainage of intra- abdominal abscess, small bowel resection and stricturoplasty with repair of enterotomies on 12/9/2019 with Dr Reeves   MRI Enterography 6/2022 showed post surgical changes c/w Juanpablo's procedure and ileocolonic resection with  primary anastomosis. No evidence of thickening or enhancement to suggest an acute Crohn's flare. Mild central intrahepatic biliary dilation with slight irregularity of the dilated duct predominantly in the left lobe without definite stricture. Cholelithiasis with gallstone in the neck of the gallbladder with abutting the common hepatic duct. Subseptate uterus with a fibroid. 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 She is unsure why it will not heal completely   Recommend and orders placed for dermatology for further evaluation 11/2023     Vitamin D def:  Discontinue scripted Vitamin D due to concerns for bone loss with higher doses  Recommend taking OTC Vitamin D3 2000 UT daily   Continue OTC Vitamin B 12 SL 1000 UT daily      Pap more than 5 years ago. She saw Dr Xie in 2/2023.  Pap attempted but failed due to tight pelvic structures > right 11/2022.    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 There is extensive abdominal scar tissue that limits exam. The endometrium is suboptimally visualized and appears to be prominent. The right ovary is subvisualized.  The left ovary is not visualized. The bladder is not well distended. Transvaginal could not be done due to patient's inability to tolerate.   Recommend hysteroscopy and D&C 2/2023. She was supposed to have the hysteroscopy and D&C in 5/2023 but there was issues with the schedules.       Mammo normal in 12/24  Family history of breast cancer, sister  diagnosed at age 45, paternal grandmother diagnosed at age 50 paternal aunt diagnosed at age 40.     She had Bx 1/12/2024 Left breast, 07:00, 7 cm from nipple, biopsy:    -- Benign fibrous breast tissue with no significant pathological findings    She saw Dr Casillas 7/2024 and has fibrocystic breast changes bilaterally. Return to yearly screenings and PCP can do breast exams   Breast exam normal except right breast more nodular than left 11/24   Mammo bilateral ordered 11/24     BD never   Colonoscopy 1/2023 normal and Q 3- 5      Ophth:  Last seen summer 2023. No history of glaucoma or MD. She wears glasses      Hep C ordered 11/24   Hep A completed x 2   Hep B vaccine completed x 3   Flu 9/21, 10/22, 11/23, 11/24 at work  TDAP she will check for date   Prevnar never  Pneumo never  Shingrix 3/2020 and 8/2020   SARS-CoV-2- 12/21, 1/21, 11/21, 5/22, 11/22, 10/23, 11/2     Some elements in the chart were copied from Dr. Palumbo's last office visit with patient.   Notes have been updated where appropriate, and reflect my current medical decision making from today.       Return in 1 year for CPE or sooner if needed   (CPE due 11/2025)      Scribe Attestation  By signing my name below, I, Kathy Quispe , Scribe   attest that this documentation has been prepared under the direction and in the presence of Dorothea Palumbo MD.

## 2025-02-28 DIAGNOSIS — R05.2 SUBACUTE COUGH: Primary | ICD-10-CM

## 2025-02-28 RX ORDER — BENZONATATE 200 MG/1
200 CAPSULE ORAL 3 TIMES DAILY PRN
Qty: 42 CAPSULE | Refills: 0 | Status: SHIPPED | OUTPATIENT
Start: 2025-02-28 | End: 2025-03-30

## 2025-03-03 ENCOUNTER — PHARMACY VISIT (OUTPATIENT)
Dept: PHARMACY | Facility: CLINIC | Age: 45
End: 2025-03-03
Payer: COMMERCIAL

## 2025-03-18 PROCEDURE — RXMED WILLOW AMBULATORY MEDICATION CHARGE

## 2025-03-19 ENCOUNTER — PHARMACY VISIT (OUTPATIENT)
Dept: PHARMACY | Facility: CLINIC | Age: 45
End: 2025-03-19
Payer: COMMERCIAL

## 2025-03-27 ENCOUNTER — SPECIALTY PHARMACY (OUTPATIENT)
Dept: PHARMACY | Facility: CLINIC | Age: 45
End: 2025-03-27

## 2025-03-27 PROCEDURE — RXMED WILLOW AMBULATORY MEDICATION CHARGE

## 2025-04-04 ENCOUNTER — PHARMACY VISIT (OUTPATIENT)
Dept: PHARMACY | Facility: CLINIC | Age: 45
End: 2025-04-04
Payer: COMMERCIAL

## 2025-04-25 ENCOUNTER — SPECIALTY PHARMACY (OUTPATIENT)
Dept: PHARMACY | Facility: CLINIC | Age: 45
End: 2025-04-25

## 2025-04-25 NOTE — PROGRESS NOTES
[facility] Clinical Note  Patient Reassessment     Introduction  Samina Javier is a 44 y.o. female who is on the specialty pharmacy service for management of: Gastroenterology Core.      [facility] supplied medication: adalimumab-adaz 40 mg once every 14 days        Duration of therapy: Maintenance    The most recent encounter visit with the referring prescriber Dr. Pierce on 8/9/24 was reviewed.  Pharmacy will continue to collaborate in the care of this patient with the referring prescriber.    Discussion  Samina was contacted on 4/25/2025 at 4:42 PM for a pharmacy visit with encounter number [phone] from:   [facility]  [address] [address]  Dept: [phone]  Dept Fax: [phone]  Samina consented to a/an Telephone visit, which was performed.    Efficacy  Patient has developed new symptoms of condition: No  Patient/caregiver feels medication is affecting the disease state: reports no noticeable difference from humira, stable     Goals  Provided education on goals and possible outcomes of therapy:  Adherence with therapy  Timely completion of appropriate labs  Timely and appropriate follow up with provider  Identify and address medication interactions with presciption medications, OTC medications and supplements  Optimize or maintain quality of life  Gastroenterology: Improve gastrointestinal clinical symptoms such as abdominal pain, inflammation, diarrhea, constipation, and bleeding, Reduce frequency and urgency of bowel movements, and Allow for GI mucosal healing (observed through endoscopy and colonoscopy)  Patient has documented target(s) for goals of therapy: Yes  Patient status for goal(s): On track        Tolerance  Patient has experienced side effects from this medication: No  Changes to current therapy regimen: No    The follow-up timeline was discussed. Every person responds to and reacts to therapy differently. Patient  should be assessed for efficacy and tolerability in approximately: 3 months            Adherence  Patient Information  Informant: Self (Patient)  Demonstrates Understanding of Importance of Adherence: Yes  Does the patient have any barriers to self-administration (including physical and mental?): No  Medication Information  Medication: adalimumab-adaz  Patient Reported Missed Doses in the Last 4 Weeks: 1  Estimated Medication Adherence Level: Good  Adherence Estimation Source: Claims history  Barriers to Adherence: No Problems identified   The importance of adherence was discussed and patient/caregiver was advised to take the medication as prescribed by their provider. Encouraged patient/caregiver to call physician's office or specialty pharmacy if they have a question regarding a missed dose.    General Assessment  Changes to home medications, OTCs or supplements: No  Current Medications[1]  Reported new allergies: No  Reported new medical conditions: No  Additional monitoring reviewed: Gastroenterology - CBC-diff:   Lab Results   Component Value Date    WBC 6.4 08/12/2024    RBC 4.25 08/12/2024    HGB 11.1 (L) 08/12/2024    HCT 36.1 08/12/2024    MCV 85 08/12/2024    MCHC 30.7 (L) 08/12/2024     08/12/2024    RDW 16.4 (H) 08/12/2024    NEUTOPHILPCT 51.1 08/12/2024    IGPCT 0.2 08/12/2024    LYMPHOPCT 35.7 08/12/2024    MONOPCT 8.8 08/12/2024    EOSPCT 3.6 08/12/2024    BASOPCT 0.6 08/12/2024    NEUTROABS 3.26 08/12/2024    LYMPHSABS 2.28 08/12/2024    MONOSABS 0.56 08/12/2024    EOSABS 0.23 08/12/2024    BASOSABS 0.04 08/12/2024   , CMP:   Lab Results   Component Value Date    GLUCOSE 86 08/12/2024     08/12/2024    K 3.9 08/12/2024     08/12/2024    CO2 29 08/12/2024    ANIONGAP 14 08/12/2024    BUN 17 08/12/2024    CREATININE 0.70 08/12/2024    GFRF >90 03/24/2022    CALCIUM 9.7 08/12/2024    ALBUMIN 4.0 08/12/2024    ALKPHOS 45 08/12/2024    PROT 7.6 08/12/2024    AST 19 08/12/2024    BILITOT  0.6 08/12/2024    ALT 15 08/12/2024   , TB:   Lab Results   Component Value Date    TBSIN Negative 03/24/2022    QFG NEGATIVE 11/20/2018   , and Fecal calprotectin:   Lab Results   Component Value Date    CALPS 13 03/25/2022     Is laboratory follow up needed? No    Advised to contact the pharmacy if there are any changes to the patient's medication list, including prescriptions, OTC medications, herbal products, or supplements.    Impression/Plan  This patient has not been identified as high risk due to Lack of high risk qualifiers.  The following action was taken:N/A    Provided contact information ([phone]) for [facility] and reviewed dispensing process, refill timeline and patient management follow up. Confirmed understanding of education conducted during assessment. All questions and concerns were addressed and patient/caregiver was encouraged to reach out for additional questions or concerns.    Based on the patient's diagnosis, medication list, progress towards goals, adherence, tolerance, and medication list, medication remains appropriate: Therapy remains appropriate (I attest)    Cheryl Dsouza, PharmD       [1]   Current Outpatient Medications   Medication Sig Dispense Refill    adalimumab-adaz 40 mg/0.4 mL pen injector Inject 40 mg (1 pen) under the skin every 14 (fourteen) days. 0.8 mL 5    amLODIPine (Norvasc) 5 mg tablet Take 1 tablet (5 mg) by mouth once daily. 100 tablet 3    cyanocobalamin (Vitamin B-12) 500 mcg tablet Take 1 tablet (500 mcg) by mouth once daily.      ergocalciferol (Vitamin D-2) 1.25 MG (19177 UT) capsule Take 1 capsule by mouth every 2 weeks 6 capsule 3    hydroCHLOROthiazide (Microzide) 12.5 mg tablet Take 1 tablet (12.5 mg) by mouth once daily. 90 tablet 3    loperamide (Imodium) 2 mg capsule Take 1 capsule (2 mg) by mouth 3 times a day as needed for diarrhea.       No current facility-administered medications for this visit.

## 2025-04-29 ENCOUNTER — SPECIALTY PHARMACY (OUTPATIENT)
Dept: PHARMACY | Facility: CLINIC | Age: 45
End: 2025-04-29

## 2025-04-29 PROCEDURE — RXMED WILLOW AMBULATORY MEDICATION CHARGE

## 2025-04-30 ENCOUNTER — PHARMACY VISIT (OUTPATIENT)
Dept: PHARMACY | Facility: CLINIC | Age: 45
End: 2025-04-30
Payer: COMMERCIAL

## 2025-06-03 ENCOUNTER — SPECIALTY PHARMACY (OUTPATIENT)
Dept: PHARMACY | Facility: CLINIC | Age: 45
End: 2025-06-03

## 2025-06-10 ENCOUNTER — SPECIALTY PHARMACY (OUTPATIENT)
Dept: PHARMACY | Facility: CLINIC | Age: 45
End: 2025-06-10

## 2025-06-10 PROCEDURE — RXMED WILLOW AMBULATORY MEDICATION CHARGE

## 2025-06-11 ENCOUNTER — PHARMACY VISIT (OUTPATIENT)
Dept: PHARMACY | Facility: CLINIC | Age: 45
End: 2025-06-11
Payer: COMMERCIAL

## 2025-06-23 DIAGNOSIS — I10 HYPERTENSION, ESSENTIAL: ICD-10-CM

## 2025-06-23 PROCEDURE — RXMED WILLOW AMBULATORY MEDICATION CHARGE

## 2025-06-23 RX ORDER — AMLODIPINE BESYLATE 5 MG/1
5 TABLET ORAL DAILY
Qty: 100 TABLET | Refills: 3 | Status: SHIPPED | OUTPATIENT
Start: 2025-06-23 | End: 2026-06-23

## 2025-06-26 ENCOUNTER — PHARMACY VISIT (OUTPATIENT)
Dept: PHARMACY | Facility: CLINIC | Age: 45
End: 2025-06-26
Payer: COMMERCIAL

## 2025-07-07 PROCEDURE — RXMED WILLOW AMBULATORY MEDICATION CHARGE

## 2025-07-08 ENCOUNTER — SPECIALTY PHARMACY (OUTPATIENT)
Dept: PHARMACY | Facility: CLINIC | Age: 45
End: 2025-07-08

## 2025-07-08 PROCEDURE — RXMED WILLOW AMBULATORY MEDICATION CHARGE

## 2025-07-09 ENCOUNTER — PHARMACY VISIT (OUTPATIENT)
Dept: PHARMACY | Facility: CLINIC | Age: 45
End: 2025-07-09
Payer: COMMERCIAL

## 2025-07-10 ENCOUNTER — PHARMACY VISIT (OUTPATIENT)
Dept: PHARMACY | Facility: CLINIC | Age: 45
End: 2025-07-10
Payer: COMMERCIAL

## 2025-08-04 ENCOUNTER — SPECIALTY PHARMACY (OUTPATIENT)
Dept: PHARMACY | Facility: CLINIC | Age: 45
End: 2025-08-04

## 2025-08-04 DIAGNOSIS — K50.10 CROHN'S DISEASE OF LARGE INTESTINE WITHOUT COMPLICATION (MULTI): ICD-10-CM

## 2025-08-04 PROCEDURE — RXMED WILLOW AMBULATORY MEDICATION CHARGE

## 2025-08-04 RX ORDER — ADALIMUMAB-ADAZ 40 MG/.4ML
40 INJECTION, SOLUTION SUBCUTANEOUS
Qty: 0.8 ML | Refills: 5 | Status: SHIPPED | OUTPATIENT
Start: 2025-08-04

## 2025-08-11 ENCOUNTER — PHARMACY VISIT (OUTPATIENT)
Dept: PHARMACY | Facility: CLINIC | Age: 45
End: 2025-08-11
Payer: COMMERCIAL

## 2025-08-11 ENCOUNTER — SPECIALTY PHARMACY (OUTPATIENT)
Dept: PHARMACY | Facility: CLINIC | Age: 45
End: 2025-08-11

## 2025-08-14 DIAGNOSIS — E55.9 VITAMIN D DEFICIENCY: ICD-10-CM

## 2025-08-14 PROCEDURE — RXMED WILLOW AMBULATORY MEDICATION CHARGE

## 2025-08-14 RX ORDER — ERGOCALCIFEROL 1.25 MG/1
CAPSULE ORAL
Qty: 6 CAPSULE | Refills: 3 | Status: CANCELLED | OUTPATIENT
Start: 2025-08-14

## 2025-08-14 RX ORDER — ERGOCALCIFEROL 1.25 MG/1
CAPSULE ORAL
Qty: 6 CAPSULE | Refills: 3 | Status: SHIPPED | OUTPATIENT
Start: 2025-08-14

## 2025-08-15 ENCOUNTER — SPECIALTY PHARMACY (OUTPATIENT)
Dept: PHARMACY | Facility: CLINIC | Age: 45
End: 2025-08-15

## 2025-08-19 ENCOUNTER — PHARMACY VISIT (OUTPATIENT)
Dept: PHARMACY | Facility: CLINIC | Age: 45
End: 2025-08-19
Payer: COMMERCIAL

## 2025-08-26 ENCOUNTER — SPECIALTY PHARMACY (OUTPATIENT)
Dept: PHARMACY | Facility: CLINIC | Age: 45
End: 2025-08-26

## 2025-08-26 PROCEDURE — RXMED WILLOW AMBULATORY MEDICATION CHARGE

## 2025-08-27 ENCOUNTER — PHARMACY VISIT (OUTPATIENT)
Dept: PHARMACY | Facility: CLINIC | Age: 45
End: 2025-08-27
Payer: COMMERCIAL

## 2025-11-13 ENCOUNTER — APPOINTMENT (OUTPATIENT)
Dept: PRIMARY CARE | Facility: CLINIC | Age: 45
End: 2025-11-13
Payer: COMMERCIAL